# Patient Record
Sex: MALE | Race: WHITE | NOT HISPANIC OR LATINO | Employment: OTHER | URBAN - METROPOLITAN AREA
[De-identification: names, ages, dates, MRNs, and addresses within clinical notes are randomized per-mention and may not be internally consistent; named-entity substitution may affect disease eponyms.]

---

## 2018-06-25 PROBLEM — M25.512 ACUTE PAIN OF LEFT SHOULDER: Status: ACTIVE | Noted: 2018-06-25

## 2022-10-30 PROBLEM — I10 BENIGN ESSENTIAL HTN: Status: ACTIVE | Noted: 2022-10-30

## 2022-10-30 PROBLEM — T50.902A INTENTIONAL OVERDOSE (HCC): Status: ACTIVE | Noted: 2022-10-30

## 2022-10-30 PROBLEM — R45.851 SUICIDAL IDEATION: Status: ACTIVE | Noted: 2022-10-30

## 2022-10-30 PROBLEM — R74.01 TRANSAMINITIS: Status: ACTIVE | Noted: 2022-10-30

## 2022-10-30 PROBLEM — K21.9 GASTROESOPHAGEAL REFLUX DISEASE WITHOUT ESOPHAGITIS: Status: ACTIVE | Noted: 2022-10-30

## 2022-10-30 PROBLEM — F10.930 ALCOHOL WITHDRAWAL SYNDROME WITHOUT COMPLICATION (HCC): Status: ACTIVE | Noted: 2022-10-30

## 2022-10-30 PROBLEM — N40.0 BENIGN PROSTATIC HYPERPLASIA WITHOUT LOWER URINARY TRACT SYMPTOMS: Status: ACTIVE | Noted: 2022-10-30

## 2022-10-30 PROBLEM — E87.6 HYPOKALEMIA: Status: ACTIVE | Noted: 2022-10-30

## 2024-02-18 PROBLEM — J90 PLEURAL EFFUSION: Status: ACTIVE | Noted: 2024-02-18

## 2024-02-18 PROBLEM — S22.43XD MULTIPLE CLOSED FRACTURES OF RIBS OF BOTH SIDES WITH ROUTINE HEALING: Status: ACTIVE | Noted: 2024-02-18

## 2024-02-18 PROBLEM — S22.41XA MULTIPLE FRACTURES OF RIBS, RIGHT SIDE, INITIAL ENCOUNTER FOR CLOSED FRACTURE: Status: RESOLVED | Noted: 2024-02-18 | Resolved: 2024-02-18

## 2024-02-18 PROBLEM — J18.9 COMMUNITY ACQUIRED PNEUMONIA OF RIGHT LOWER LOBE OF LUNG: Status: ACTIVE | Noted: 2024-02-18

## 2024-02-18 PROBLEM — S22.41XA MULTIPLE FRACTURES OF RIBS, RIGHT SIDE, INITIAL ENCOUNTER FOR CLOSED FRACTURE: Status: ACTIVE | Noted: 2024-02-18

## 2024-03-06 PROBLEM — K55.20 GI AVM (GASTROINTESTINAL ARTERIOVENOUS VASCULAR MALFORMATION): Status: ACTIVE | Noted: 2024-03-06

## 2024-03-11 PROBLEM — J18.9 COMMUNITY ACQUIRED PNEUMONIA OF RIGHT LOWER LOBE OF LUNG: Status: RESOLVED | Noted: 2024-02-18 | Resolved: 2024-03-11

## 2024-03-11 PROBLEM — J90 PLEURAL EFFUSION: Status: RESOLVED | Noted: 2024-02-18 | Resolved: 2024-03-11

## 2024-06-25 ENCOUNTER — APPOINTMENT (OUTPATIENT)
Dept: RADIOLOGY | Facility: MEDICAL CENTER | Age: 69
End: 2024-06-25
Attending: SURGERY
Payer: MEDICARE

## 2024-06-25 ENCOUNTER — HOSPITAL ENCOUNTER (OUTPATIENT)
Dept: RADIOLOGY | Facility: MEDICAL CENTER | Age: 69
End: 2024-06-25

## 2024-06-25 ENCOUNTER — HOSPITAL ENCOUNTER (INPATIENT)
Facility: MEDICAL CENTER | Age: 69
LOS: 2 days | End: 2024-06-27
Attending: EMERGENCY MEDICINE | Admitting: SURGERY
Payer: MEDICARE

## 2024-06-25 DIAGNOSIS — S01.81XA FACIAL LACERATION, INITIAL ENCOUNTER: ICD-10-CM

## 2024-06-25 DIAGNOSIS — S06.34AA TRAUMATIC RIGHT-SIDED INTRACEREBRAL HEMORRHAGE WITH UNKNOWN LOSS OF CONSCIOUSNESS STATUS, INITIAL ENCOUNTER (HCC): ICD-10-CM

## 2024-06-25 DIAGNOSIS — F10.10 ALCOHOL ABUSE: ICD-10-CM

## 2024-06-25 DIAGNOSIS — S02.2XXA CLOSED FRACTURE OF NASAL BONE, INITIAL ENCOUNTER: ICD-10-CM

## 2024-06-25 DIAGNOSIS — K21.00 GASTROESOPHAGEAL REFLUX DISEASE WITH ESOPHAGITIS WITHOUT HEMORRHAGE: ICD-10-CM

## 2024-06-25 DIAGNOSIS — S06.6XAA TRAUMATIC SUBARACHNOID HEMORRHAGE WITH UNKNOWN LOSS OF CONSCIOUSNESS STATUS, INITIAL ENCOUNTER (HCC): ICD-10-CM

## 2024-06-25 PROBLEM — D68.9 COAGULOPATHY (HCC): Status: ACTIVE | Noted: 2024-06-25

## 2024-06-25 PROBLEM — Z87.81 HISTORY OF RIB FRACTURE: Status: ACTIVE | Noted: 2024-06-25

## 2024-06-25 PROBLEM — D50.9 IRON DEFICIENCY ANEMIA: Status: ACTIVE | Noted: 2024-06-25

## 2024-06-25 PROBLEM — F10.90 ALCOHOL USE DISORDER: Status: ACTIVE | Noted: 2024-06-25

## 2024-06-25 PROBLEM — Z53.09 CONTRAINDICATION TO DEEP VEIN THROMBOSIS (DVT) PROPHYLAXIS: Status: ACTIVE | Noted: 2024-06-25

## 2024-06-25 PROBLEM — S01.81XD FOREHEAD LACERATION, SUBSEQUENT ENCOUNTER: Status: ACTIVE | Noted: 2024-06-25

## 2024-06-25 PROBLEM — E87.20 LACTIC ACIDOSIS: Status: ACTIVE | Noted: 2024-06-25

## 2024-06-25 PROBLEM — T14.90XA TRAUMA: Status: ACTIVE | Noted: 2024-06-25

## 2024-06-25 LAB
ABO GROUP BLD: NORMAL
ALBUMIN SERPL BCP-MCNC: 3.4 G/DL (ref 3.2–4.9)
ALBUMIN/GLOB SERPL: 1.4 G/DL
ALP SERPL-CCNC: 128 U/L (ref 30–99)
ALT SERPL-CCNC: 15 U/L (ref 2–50)
ANION GAP SERPL CALC-SCNC: 19 MMOL/L (ref 7–16)
AST SERPL-CCNC: 37 U/L (ref 12–45)
BILIRUB SERPL-MCNC: 0.8 MG/DL (ref 0.1–1.5)
BLD GP AB SCN SERPL QL: NORMAL
BUN SERPL-MCNC: 16 MG/DL (ref 8–22)
CALCIUM ALBUM COR SERPL-MCNC: 8.5 MG/DL (ref 8.5–10.5)
CALCIUM SERPL-MCNC: 8 MG/DL (ref 8.5–10.5)
CFT BLD TEG: 4.6 MIN (ref 4.6–9.1)
CFT P HPASE BLD TEG: 4.4 MIN (ref 4.3–8.3)
CHLORIDE SERPL-SCNC: 98 MMOL/L (ref 96–112)
CLOT ANGLE BLD TEG: 73.5 DEGREES (ref 63–78)
CO2 SERPL-SCNC: 15 MMOL/L (ref 20–33)
CREAT SERPL-MCNC: 0.75 MG/DL (ref 0.5–1.4)
CT.EXTRINSIC BLD ROTEM: 1.3 MIN (ref 0.8–2.1)
ERYTHROCYTE [DISTWIDTH] IN BLOOD BY AUTOMATED COUNT: 59.2 FL (ref 35.9–50)
ETHANOL BLD-MCNC: 100.5 MG/DL
GFR SERPLBLD CREATININE-BSD FMLA CKD-EPI: 98 ML/MIN/1.73 M 2
GLOBULIN SER CALC-MCNC: 2.4 G/DL (ref 1.9–3.5)
GLUCOSE BLD STRIP.AUTO-MCNC: 118 MG/DL (ref 65–99)
GLUCOSE BLD STRIP.AUTO-MCNC: 128 MG/DL (ref 65–99)
GLUCOSE BLD STRIP.AUTO-MCNC: 245 MG/DL (ref 65–99)
GLUCOSE BLD STRIP.AUTO-MCNC: 92 MG/DL (ref 65–99)
GLUCOSE SERPL-MCNC: 102 MG/DL (ref 65–99)
HCT VFR BLD AUTO: 33.4 % (ref 42–52)
HGB BLD-MCNC: 10.9 G/DL (ref 14–18)
IRON SATN MFR SERPL: ABNORMAL % (ref 15–55)
IRON SERPL-MCNC: 270 UG/DL (ref 50–180)
LACTATE SERPL-SCNC: 1.2 MMOL/L (ref 0.5–2)
MCF BLD TEG: 58.8 MM (ref 52–69)
MCF.PLATELET INHIB BLD ROTEM: 17.5 MM (ref 15–32)
MCH RBC QN AUTO: 29.1 PG (ref 27–33)
MCHC RBC AUTO-ENTMCNC: 32.6 G/DL (ref 32.3–36.5)
MCV RBC AUTO: 89.3 FL (ref 81.4–97.8)
PA AA BLD-ACNC: 24 % (ref 0–11)
PA ADP BLD-ACNC: 79.4 % (ref 0–17)
PLATELET # BLD AUTO: 210 K/UL (ref 164–446)
PMV BLD AUTO: 9.3 FL (ref 9–12.9)
POTASSIUM SERPL-SCNC: 3.8 MMOL/L (ref 3.6–5.5)
PROT SERPL-MCNC: 5.8 G/DL (ref 6–8.2)
RBC # BLD AUTO: 3.74 M/UL (ref 4.7–6.1)
RH BLD: NORMAL
SODIUM SERPL-SCNC: 132 MMOL/L (ref 135–145)
TEG ALGORITHM TGALG: ABNORMAL
TIBC SERPL-MCNC: ABNORMAL UG/DL (ref 250–450)
UIBC SERPL-MCNC: <17 UG/DL (ref 110–370)
WBC # BLD AUTO: 11.8 K/UL (ref 4.8–10.8)

## 2024-06-25 PROCEDURE — 700111 HCHG RX REV CODE 636 W/ 250 OVERRIDE (IP): Mod: JZ

## 2024-06-25 PROCEDURE — 700111 HCHG RX REV CODE 636 W/ 250 OVERRIDE (IP): Performed by: SURGERY

## 2024-06-25 PROCEDURE — 86850 RBC ANTIBODY SCREEN: CPT

## 2024-06-25 PROCEDURE — 99291 CRITICAL CARE FIRST HOUR: CPT

## 2024-06-25 PROCEDURE — 700102 HCHG RX REV CODE 250 W/ 637 OVERRIDE(OP)

## 2024-06-25 PROCEDURE — 36415 COLL VENOUS BLD VENIPUNCTURE: CPT

## 2024-06-25 PROCEDURE — 700102 HCHG RX REV CODE 250 W/ 637 OVERRIDE(OP): Performed by: NURSE PRACTITIONER

## 2024-06-25 PROCEDURE — 83540 ASSAY OF IRON: CPT

## 2024-06-25 PROCEDURE — 85384 FIBRINOGEN ACTIVITY: CPT

## 2024-06-25 PROCEDURE — 700111 HCHG RX REV CODE 636 W/ 250 OVERRIDE (IP): Performed by: NURSE PRACTITIONER

## 2024-06-25 PROCEDURE — 80053 COMPREHEN METABOLIC PANEL: CPT | Mod: 91

## 2024-06-25 PROCEDURE — 99223 1ST HOSP IP/OBS HIGH 75: CPT | Performed by: SURGERY

## 2024-06-25 PROCEDURE — A9270 NON-COVERED ITEM OR SERVICE: HCPCS | Performed by: NURSE PRACTITIONER

## 2024-06-25 PROCEDURE — 85576 BLOOD PLATELET AGGREGATION: CPT | Mod: 91

## 2024-06-25 PROCEDURE — 86901 BLOOD TYPING SEROLOGIC RH(D): CPT

## 2024-06-25 PROCEDURE — 97162 PT EVAL MOD COMPLEX 30 MIN: CPT

## 2024-06-25 PROCEDURE — 83550 IRON BINDING TEST: CPT

## 2024-06-25 PROCEDURE — 85027 COMPLETE CBC AUTOMATED: CPT

## 2024-06-25 PROCEDURE — 770022 HCHG ROOM/CARE - ICU (200)

## 2024-06-25 PROCEDURE — 700102 HCHG RX REV CODE 250 W/ 637 OVERRIDE(OP): Performed by: SURGERY

## 2024-06-25 PROCEDURE — 82077 ASSAY SPEC XCP UR&BREATH IA: CPT

## 2024-06-25 PROCEDURE — 700105 HCHG RX REV CODE 258

## 2024-06-25 PROCEDURE — 83605 ASSAY OF LACTIC ACID: CPT | Mod: 91

## 2024-06-25 PROCEDURE — 97165 OT EVAL LOW COMPLEX 30 MIN: CPT

## 2024-06-25 PROCEDURE — 86900 BLOOD TYPING SEROLOGIC ABO: CPT

## 2024-06-25 PROCEDURE — 700105 HCHG RX REV CODE 258: Performed by: SURGERY

## 2024-06-25 PROCEDURE — A9270 NON-COVERED ITEM OR SERVICE: HCPCS | Performed by: SURGERY

## 2024-06-25 PROCEDURE — 700101 HCHG RX REV CODE 250: Performed by: SURGERY

## 2024-06-25 PROCEDURE — A9270 NON-COVERED ITEM OR SERVICE: HCPCS

## 2024-06-25 PROCEDURE — 82962 GLUCOSE BLOOD TEST: CPT

## 2024-06-25 PROCEDURE — 700105 HCHG RX REV CODE 258: Performed by: NURSE PRACTITIONER

## 2024-06-25 PROCEDURE — 70450 CT HEAD/BRAIN W/O DYE: CPT

## 2024-06-25 PROCEDURE — 92523 SPEECH SOUND LANG COMPREHEN: CPT

## 2024-06-25 PROCEDURE — 305948 HCHG GREEN TRAUMA ACT PRE-NOTIFY NO CC

## 2024-06-25 PROCEDURE — 85347 COAGULATION TIME ACTIVATED: CPT

## 2024-06-25 RX ORDER — ONDANSETRON 4 MG/1
4 TABLET, ORALLY DISINTEGRATING ORAL EVERY 4 HOURS PRN
Status: DISCONTINUED | OUTPATIENT
Start: 2024-06-25 | End: 2024-06-27 | Stop reason: HOSPADM

## 2024-06-25 RX ORDER — LORAZEPAM 2 MG/ML
4 INJECTION INTRAMUSCULAR
Status: DISCONTINUED | OUTPATIENT
Start: 2024-06-25 | End: 2024-06-26

## 2024-06-25 RX ORDER — AMLODIPINE BESYLATE 5 MG/1
10 TABLET ORAL DAILY
Status: DISCONTINUED | OUTPATIENT
Start: 2024-06-25 | End: 2024-06-27 | Stop reason: HOSPADM

## 2024-06-25 RX ORDER — OXYCODONE HYDROCHLORIDE 5 MG/1
2.5 TABLET ORAL
Status: DISCONTINUED | OUTPATIENT
Start: 2024-06-25 | End: 2024-06-27 | Stop reason: HOSPADM

## 2024-06-25 RX ORDER — BISACODYL 10 MG
10 SUPPOSITORY, RECTAL RECTAL
Status: DISCONTINUED | OUTPATIENT
Start: 2024-06-25 | End: 2024-06-27 | Stop reason: HOSPADM

## 2024-06-25 RX ORDER — LORAZEPAM 2 MG/ML
1 INJECTION INTRAMUSCULAR
Status: DISCONTINUED | OUTPATIENT
Start: 2024-06-25 | End: 2024-06-26

## 2024-06-25 RX ORDER — OMEPRAZOLE 20 MG/1
20 CAPSULE, DELAYED RELEASE ORAL DAILY
Status: DISCONTINUED | OUTPATIENT
Start: 2024-06-25 | End: 2024-06-27 | Stop reason: HOSPADM

## 2024-06-25 RX ORDER — LORAZEPAM 2 MG/ML
2 INJECTION INTRAMUSCULAR
Status: DISCONTINUED | OUTPATIENT
Start: 2024-06-25 | End: 2024-06-26

## 2024-06-25 RX ORDER — IBUPROFEN 200 MG
400 TABLET ORAL EVERY 8 HOURS PRN
Status: ON HOLD | COMMUNITY
End: 2024-06-27

## 2024-06-25 RX ORDER — OXYCODONE HYDROCHLORIDE 5 MG/1
5 TABLET ORAL
Status: DISCONTINUED | OUTPATIENT
Start: 2024-06-25 | End: 2024-06-27 | Stop reason: HOSPADM

## 2024-06-25 RX ORDER — GAUZE BANDAGE 2" X 2"
100 BANDAGE TOPICAL DAILY
Status: DISCONTINUED | OUTPATIENT
Start: 2024-06-26 | End: 2024-06-27 | Stop reason: HOSPADM

## 2024-06-25 RX ORDER — ACETAMINOPHEN 500 MG
1000 TABLET ORAL EVERY 6 HOURS PRN
COMMUNITY

## 2024-06-25 RX ORDER — ENEMA 19; 7 G/133ML; G/133ML
1 ENEMA RECTAL
Status: DISCONTINUED | OUTPATIENT
Start: 2024-06-25 | End: 2024-06-27 | Stop reason: HOSPADM

## 2024-06-25 RX ORDER — FOLIC ACID 1 MG/1
1 TABLET ORAL DAILY
Status: DISCONTINUED | OUTPATIENT
Start: 2024-06-26 | End: 2024-06-27 | Stop reason: HOSPADM

## 2024-06-25 RX ORDER — POLYETHYLENE GLYCOL 3350 17 G/17G
1 POWDER, FOR SOLUTION ORAL 2 TIMES DAILY
Status: DISCONTINUED | OUTPATIENT
Start: 2024-06-25 | End: 2024-06-27 | Stop reason: HOSPADM

## 2024-06-25 RX ORDER — SODIUM CHLORIDE 9 MG/ML
INJECTION, SOLUTION INTRAVENOUS CONTINUOUS
Status: DISCONTINUED | OUTPATIENT
Start: 2024-06-25 | End: 2024-06-26

## 2024-06-25 RX ORDER — ONDANSETRON 2 MG/ML
4 INJECTION INTRAMUSCULAR; INTRAVENOUS EVERY 4 HOURS PRN
Status: DISCONTINUED | OUTPATIENT
Start: 2024-06-25 | End: 2024-06-27 | Stop reason: HOSPADM

## 2024-06-25 RX ORDER — FERROUS SULFATE 325(65) MG
325 TABLET ORAL
Status: DISCONTINUED | OUTPATIENT
Start: 2024-06-26 | End: 2024-06-27 | Stop reason: HOSPADM

## 2024-06-25 RX ORDER — AMOXICILLIN 250 MG
1 CAPSULE ORAL NIGHTLY
Status: DISCONTINUED | OUTPATIENT
Start: 2024-06-25 | End: 2024-06-27 | Stop reason: HOSPADM

## 2024-06-25 RX ORDER — BACITRACIN ZINC 500 [USP'U]/G
OINTMENT TOPICAL 2 TIMES DAILY
Status: DISCONTINUED | OUTPATIENT
Start: 2024-06-25 | End: 2024-06-27 | Stop reason: HOSPADM

## 2024-06-25 RX ORDER — LORAZEPAM 2 MG/ML
3 INJECTION INTRAMUSCULAR
Status: DISCONTINUED | OUTPATIENT
Start: 2024-06-25 | End: 2024-06-26

## 2024-06-25 RX ORDER — AMOXICILLIN 250 MG
1 CAPSULE ORAL
Status: DISCONTINUED | OUTPATIENT
Start: 2024-06-25 | End: 2024-06-27 | Stop reason: HOSPADM

## 2024-06-25 RX ORDER — LEVETIRACETAM 500 MG/5ML
500 INJECTION, SOLUTION, CONCENTRATE INTRAVENOUS EVERY 12 HOURS
Status: DISCONTINUED | OUTPATIENT
Start: 2024-06-25 | End: 2024-06-27 | Stop reason: HOSPADM

## 2024-06-25 RX ORDER — HYDROMORPHONE HYDROCHLORIDE 1 MG/ML
0.25 INJECTION, SOLUTION INTRAMUSCULAR; INTRAVENOUS; SUBCUTANEOUS
Status: DISCONTINUED | OUTPATIENT
Start: 2024-06-25 | End: 2024-06-27 | Stop reason: HOSPADM

## 2024-06-25 RX ORDER — DOCUSATE SODIUM 100 MG/1
100 CAPSULE, LIQUID FILLED ORAL 2 TIMES DAILY
Status: DISCONTINUED | OUTPATIENT
Start: 2024-06-25 | End: 2024-06-27 | Stop reason: HOSPADM

## 2024-06-25 RX ORDER — ACETAMINOPHEN 325 MG/1
650 TABLET ORAL EVERY 4 HOURS PRN
Status: DISCONTINUED | OUTPATIENT
Start: 2024-06-25 | End: 2024-06-27 | Stop reason: HOSPADM

## 2024-06-25 RX ORDER — LEVETIRACETAM 500 MG/1
500 TABLET ORAL EVERY 12 HOURS
Status: DISCONTINUED | OUTPATIENT
Start: 2024-06-25 | End: 2024-06-27 | Stop reason: HOSPADM

## 2024-06-25 RX ADMIN — OXYCODONE HYDROCHLORIDE 5 MG: 5 TABLET ORAL at 08:10

## 2024-06-25 RX ADMIN — BACITRACIN ZINC: 500 OINTMENT TOPICAL at 09:29

## 2024-06-25 RX ADMIN — AMLODIPINE BESYLATE 10 MG: 10 TABLET ORAL at 12:48

## 2024-06-25 RX ADMIN — OXYCODONE HYDROCHLORIDE 5 MG: 5 TABLET ORAL at 12:51

## 2024-06-25 RX ADMIN — FOLIC ACID: 5 INJECTION, SOLUTION INTRAMUSCULAR; INTRAVENOUS; SUBCUTANEOUS at 10:39

## 2024-06-25 RX ADMIN — BACITRACIN ZINC: 500 OINTMENT TOPICAL at 17:16

## 2024-06-25 RX ADMIN — INSULIN HUMAN 2 UNITS: 100 INJECTION, SOLUTION PARENTERAL at 12:57

## 2024-06-25 RX ADMIN — OXYCODONE HYDROCHLORIDE 5 MG: 5 TABLET ORAL at 16:51

## 2024-06-25 RX ADMIN — LEVETIRACETAM 500 MG: 500 TABLET, FILM COATED ORAL at 06:46

## 2024-06-25 RX ADMIN — SODIUM CHLORIDE: 9 INJECTION, SOLUTION INTRAVENOUS at 16:53

## 2024-06-25 RX ADMIN — HYDROMORPHONE HYDROCHLORIDE 0.25 MG: 1 INJECTION, SOLUTION INTRAMUSCULAR; INTRAVENOUS; SUBCUTANEOUS at 09:29

## 2024-06-25 RX ADMIN — SODIUM CHLORIDE 900 ML: 9 INJECTION, SOLUTION INTRAVENOUS at 06:50

## 2024-06-25 RX ADMIN — SODIUM CHLORIDE 250 MG: 9 INJECTION, SOLUTION INTRAVENOUS at 17:21

## 2024-06-25 RX ADMIN — HYDROMORPHONE HYDROCHLORIDE 0.25 MG: 1 INJECTION, SOLUTION INTRAMUSCULAR; INTRAVENOUS; SUBCUTANEOUS at 22:19

## 2024-06-25 RX ADMIN — OXYCODONE HYDROCHLORIDE 5 MG: 5 TABLET ORAL at 23:55

## 2024-06-25 RX ADMIN — LEVETIRACETAM 500 MG: 500 TABLET, FILM COATED ORAL at 17:16

## 2024-06-25 RX ADMIN — OXYCODONE HYDROCHLORIDE 5 MG: 5 TABLET ORAL at 20:08

## 2024-06-25 RX ADMIN — OMEPRAZOLE 20 MG: 20 CAPSULE, DELAYED RELEASE ORAL at 12:48

## 2024-06-25 ASSESSMENT — ENCOUNTER SYMPTOMS
EYE PAIN: 0
VOMITING: 0
FEVER: 0
WEAKNESS: 0
MYALGIAS: 1
SPEECH CHANGE: 0
DIAPHORESIS: 0
SENSORY CHANGE: 0
DIZZINESS: 0
ABDOMINAL PAIN: 0
TINGLING: 0
BACK PAIN: 0
NAUSEA: 0
FOCAL WEAKNESS: 0
DOUBLE VISION: 0
COUGH: 0
SHORTNESS OF BREATH: 0
HEADACHES: 1
TREMORS: 0
NECK PAIN: 0
BLURRED VISION: 0
PHOTOPHOBIA: 0
TREMORS: 1
PALPITATIONS: 0
CHILLS: 0

## 2024-06-25 ASSESSMENT — PAIN DESCRIPTION - PAIN TYPE
TYPE: ACUTE PAIN;OTHER (COMMENT)
TYPE: ACUTE PAIN
TYPE: ACUTE PAIN
TYPE: ACUTE PAIN;OTHER (COMMENT)
TYPE: ACUTE PAIN
TYPE: ACUTE PAIN;OTHER (COMMENT)
TYPE: ACUTE PAIN
TYPE: ACUTE PAIN;OTHER (COMMENT)
TYPE: ACUTE PAIN
TYPE: ACUTE PAIN

## 2024-06-25 ASSESSMENT — GAIT ASSESSMENTS
DISTANCE (FEET): 200
DEVIATION: SHUFFLED GAIT;BRADYKINETIC;INCREASED BASE OF SUPPORT
GAIT LEVEL OF ASSIST: SUPERVISED

## 2024-06-25 ASSESSMENT — LIFESTYLE VARIABLES
ON A TYPICAL DAY WHEN YOU DRINK ALCOHOL HOW MANY DRINKS DO YOU HAVE: 8
EVER HAD A DRINK FIRST THING IN THE MORNING TO STEADY YOUR NERVES TO GET RID OF A HANGOVER: NO
EVER FELT BAD OR GUILTY ABOUT YOUR DRINKING: NO
CONSUMPTION TOTAL: INCOMPLETE
HAVE YOU EVER FELT YOU SHOULD CUT DOWN ON YOUR DRINKING: YES
DOES PATIENT WANT TO TALK TO SOMEONE ABOUT QUITTING: YES
AVERAGE NUMBER OF DAYS PER WEEK YOU HAVE A DRINK CONTAINING ALCOHOL: 7
TOTAL SCORE: 2
DOES PATIENT WANT TO STOP DRINKING: YES
HAVE PEOPLE ANNOYED YOU BY CRITICIZING YOUR DRINKING: YES
TOTAL SCORE: 2
ALCOHOL_USE: YES
TOTAL SCORE: 2

## 2024-06-25 ASSESSMENT — PATIENT HEALTH QUESTIONNAIRE - PHQ9
9. THOUGHTS THAT YOU WOULD BE BETTER OFF DEAD, OR OF HURTING YOURSELF: NOT AT ALL
8. MOVING OR SPEAKING SO SLOWLY THAT OTHER PEOPLE COULD HAVE NOTICED. OR THE OPPOSITE, BEING SO FIGETY OR RESTLESS THAT YOU HAVE BEEN MOVING AROUND A LOT MORE THAN USUAL: NOT AT ALL
3. TROUBLE FALLING OR STAYING ASLEEP OR SLEEPING TOO MUCH: MORE THAN HALF THE DAYS
1. LITTLE INTEREST OR PLEASURE IN DOING THINGS: MORE THAN HALF THE DAYS
2. FEELING DOWN, DEPRESSED, IRRITABLE, OR HOPELESS: NOT AT ALL
SUM OF ALL RESPONSES TO PHQ QUESTIONS 1-9: 4
7. TROUBLE CONCENTRATING ON THINGS, SUCH AS READING THE NEWSPAPER OR WATCHING TELEVISION: NOT AT ALL
5. POOR APPETITE OR OVEREATING: NOT AT ALL
6. FEELING BAD ABOUT YOURSELF - OR THAT YOU ARE A FAILURE OR HAVE LET YOURSELF OR YOUR FAMILY DOWN: NOT AL ALL
4. FEELING TIRED OR HAVING LITTLE ENERGY: NOT AT ALL
SUM OF ALL RESPONSES TO PHQ9 QUESTIONS 1 AND 2: 2

## 2024-06-25 ASSESSMENT — COPD QUESTIONNAIRES
DO YOU EVER COUGH UP ANY MUCUS OR PHLEGM?: NO/ONLY WITH OCCASIONAL COLDS OR INFECTIONS
DURING THE PAST 4 WEEKS HOW MUCH DID YOU FEEL SHORT OF BREATH: MOST  OR ALL OF THE TIME
COPD SCREENING SCORE: 7
HAVE YOU SMOKED AT LEAST 100 CIGARETTES IN YOUR ENTIRE LIFE: YES

## 2024-06-25 ASSESSMENT — COGNITIVE AND FUNCTIONAL STATUS - GENERAL
DAILY ACTIVITIY SCORE: 24
CLIMB 3 TO 5 STEPS WITH RAILING: A LITTLE
MOBILITY SCORE: 22
SUGGESTED CMS G CODE MODIFIER MOBILITY: CJ
WALKING IN HOSPITAL ROOM: A LITTLE
SUGGESTED CMS G CODE MODIFIER DAILY ACTIVITY: CH

## 2024-06-25 ASSESSMENT — FIBROSIS 4 INDEX
FIB4 SCORE: 3.68
FIB4 SCORE: 3.09
FIB4 SCORE: 3.09

## 2024-06-25 ASSESSMENT — ACTIVITIES OF DAILY LIVING (ADL): TOILETING: INDEPENDENT

## 2024-06-25 NOTE — ED TRIAGE NOTES
"Chief Complaint   Patient presents with    Trauma Green     Trauma green transfer from [place]. ICH W/ SAH. Fell 2 days ago, and once again tonight in care home. +ETOH, CO2 10, LA 5.3,            /71   Pulse 92   Temp 37.3 °C (99.1 °F)   Resp 18   Ht 1.778 m (5' 10\")   Wt 74.8 kg (165 lb)   SpO2 94%   BMI 23.68 kg/m²     "

## 2024-06-25 NOTE — PROGRESS NOTES
"  And Progress Note     This note is intended for the purposes of medical student education and feedback only.   Please refer to the documentation by this patient's assigned medical practitioner for details of care and plans.      CC: 70 yo male brought in as a trauma green transfer from [place] with right temporal hemorrhagic brain contusion and metabolic acidosis after fall in setting of acute alcohol intoxication. Of note, patient with history of homelessness, chronic alcohol use, and recurrent falls in setting of alcohol use.     Interval Updates: No acute events    Assessment/Plan:  -Patient with stable 10 mm parenchymal hemorrhage in right temporal lobe, meeting criteria for BIG 3 head injury. Patient to remain in TICU for 24 hrs of serial neuro checks, anticipate transfer to floor tomorrow.   -GCS 15.  -Chronic alcohol use; CIWA protocol indicated today.   -Metabolic acidosis with down trending lactic acid, trend.   -Tertiary survey negative.   -Hold DVT ppx with repeat venous duplex u/s on 6/27.    Mental status adequate for full examination?: Yes    Spine cleared (radiologically and/or clinically): Yes    REVIEW OF SYSTEMS:  Review of Systems   Constitutional:  Negative for chills and fever.   HENT:  Negative for ear pain and hearing loss.    Eyes:  Negative for blurred vision, double vision, photophobia and pain.   Respiratory:  Negative for cough and shortness of breath.    Cardiovascular:  Negative for palpitations.        Complaining of Rib pain from prior fractures in 02/2024   Gastrointestinal:  Negative for abdominal pain, nausea and vomiting.   Musculoskeletal:         Chronic bilateral shoulder pain     Neurological:  Positive for tremors and headaches. Negative for dizziness, tingling, focal weakness and weakness.         PHYSICAL EXAMINATION:  /70   Pulse 80   Temp 36.3 °C (97.3 °F) (Temporal)   Resp (!) 24   Ht 1.778 m (5' 10\")   Wt 66.1 kg (145 lb 11.6 oz)   SpO2 91%   BMI 20.91 " kg/m²   Physical Exam  Constitutional:       General: He is not in acute distress.  HENT:      Head: Normocephalic.      Comments: Healing superficial head lacerations      Right Ear: External ear normal.      Left Ear: External ear normal.      Nose: Nose normal.      Mouth/Throat:      Mouth: Mucous membranes are moist.      Pharynx: Oropharynx is clear.   Eyes:      Extraocular Movements: Extraocular movements intact.      Conjunctiva/sclera: Conjunctivae normal.      Pupils: Pupils are equal, round, and reactive to light.   Cardiovascular:      Rate and Rhythm: Normal rate and regular rhythm.      Heart sounds: No murmur heard.  Pulmonary:      Effort: Pulmonary effort is normal. No respiratory distress.      Breath sounds: Normal breath sounds. No wheezing or rhonchi.   Chest:      Chest wall: No tenderness.   Abdominal:      General: Bowel sounds are normal. There is no distension.      Palpations: Abdomen is soft.   Musculoskeletal:         General: No swelling or deformity. Normal range of motion.      Right shoulder: Tenderness present.      Left shoulder: Tenderness present.      Cervical back: Normal range of motion. No rigidity.      Comments: Good and equal strength in the upper and lower extremities bilaterally    Skin:     General: Skin is warm and dry.      Capillary Refill: Capillary refill takes less than 2 seconds.   Neurological:      General: No focal deficit present.      Mental Status: He is alert.      GCS: GCS eye subscore is 4. GCS verbal subscore is 5. GCS motor subscore is 6.   Psychiatric:         Behavior: Behavior is cooperative.         LABORATORY VALUES:  Recent Labs     06/25/24  0050 06/25/24  0548   WBC 13.6* 11.8*   RBC 4.34* 3.74*   HEMOGLOBIN 12.7* 10.9*   HEMATOCRIT 38.9* 33.4*   MCV 89.6 89.3   MCH 29.3 29.1   MCHC 32.6* 32.6   RDW 18.1* 59.2*   PLATELETCT 224 210   MPV 9.4 9.3     Recent Labs     06/25/24  0050 06/25/24  0548   SODIUM 133* 132*   POTASSIUM 4.4 3.8   CHLORIDE  99 98   CO2 10* 15*   GLUCOSE 97 102*   BUN 21* 16   CREATININE 1.3 0.75   CALCIUM 8.8 8.0*     Recent Labs     06/25/24  0050 06/25/24  0548   ASTSGOT 63* 37   ALTSGPT 27 15   TBILIRUBIN 1.1 0.8   ALKPHOSPHAT 145* 128*   GLOBULIN  --  2.4   INR 0.95  --      Recent Labs     06/25/24  0050   APTT 25.1   INR 0.95       IMAGING:  CT-HEAD W/O   Final Result      1.  Mild cerebral atrophy as well as atrophy of the superior cerebellar vermis.   2.  Encephalomalacic changes in the right temporal lobe consistent with old infarct, chronic sequela of posttraumatic brain contusion, or other remote insult.   3.  Stable appearing 10 mm parenchymal hemorrhage in the right temporal lobe most consistent with hemorrhagic brain contusion. No change from outside films from earlier on today's date      Based solely on CT findings, the brain injury guideline category is mBIG 3.      EDH   IVH   Displaced skull fx   SDH > 8mm   IPH > 8mm or multiple   SAH bi-hemispheric or > 3mm      The original BIG retrospective analysis found radiographic progression in 0% of BIG 1 patients and 2.6% BIG 2.            OUTSIDE IMAGES-DX CHEST   Final Result      OUTSIDE IMAGES-CT HEAD   Final Result      OUTSIDE IMAGES-CT CHEST   Final Result      OUTSIDE IMAGES-CT FACE   Final Result      OUTSIDE IMAGES-CT CERVICAL SPINE   Final Result          RAP Score Total: 6      CAGE Results: not completed Blood Alcohol>0.08: yes CAGE Score: 2  Total: Incomplete  Assessment complete date: 6/25/2024  Intervention: Complete. Patient response to intervention:.   Patient demonstrates understanding of intervention. Patient agrees to follow-up.   has been contacted. Follow up with: PCP  Total ETOH intervention time: 15 - 30 mintues      PDI Score: Not completed   (Score > 23 = Psychiatry consult)    All current laboratory studies/radiology exams reviewed: Yes    Medications reconciliation has been reviewed: Yes    Completed Consultations:    Sekou Celaya, neurosurgery and SLP,     Pending Consultations:  PT, OT     Newly identified diagnoses, injuries and/or co-morbidities:  None    Discussed patient condition with patient, nurse, Trauma PA, Dr. Reynolds      This note is intended for the purposes of medical student education and feedback only.   Please refer to the documentation by this patient's assigned medical practitioner for details of care and plans.    Assessment and Plan     Trauma  Reportedly fell multiple times within last 5 days. Fell again in skilled nursing last night and was taken to ED for evaluation.  Trauma Green Transfer Activation from [facility] in [place], [place].  America Eric MD. Trauma Surgery.    Traumatic subarachnoid hemorrhage with unknown loss of consciousness status (HCC)  1.2 cm subarachnoid hemorrhage right temporal lobe.  Repeat head CT stable.  Non-operative management.  Post traumatic pharmacologic seizure prophylaxis for 1 week.  Speech Language Pathology cognitive evaluation.  Sekou Celaya MD. Neurosurgeon. [facility].    Lactic acidosis  Initial lactic level at [facility] 5.3.  Repeat level 4.0  Trend labs.    Alcohol use disorder  Blood alcohol level of 260 at [facility].  Per chart review history of withdrawal syndrome  Trauma alcohol withdrawal protocol initiated.  Alcohol withdrawal surveillance.    Contraindication to deep vein thrombosis (DVT) prophylaxis  VTE prophylaxis initially contraindicated secondary to elevated bleeding risk.  6/27 Trauma surveillance venous duplex ultrasonography ordered.    Nasal bone fractures  Bilateral nasal bone fractures, possibly chronic.  Non operative management.  Analgesia.    Benign essential HTN  Chronic condition treated with amlodipine.  Resumed maintenance medication on admission.    GERD with esophagitis  Recent EGD in March with esophagitis.   Chronic condition treated with protonix.  Resumed maintenance medication  on admission.    Forehead laceration, subsequent encounter  2 forehead lacerations.   Bacitracin.    History of rib fracture  Bilateral chronic rib fractures.   Aggressive pulmonary hygiene.    Coagulopathy (HCC)  Admit TEG with 79.4% AA inhibition.   Repeat head CT stable, no platelet transfusion indicated per neurosurgery.    Iron deficiency anemia  Admit Hgb 10.9  Chronic condition treated with iron sulfate.  Resumed maintenance medication on admission.  Check iron studies.   Monitor Hgb & transfuse PRBCs for Hgb < 7.0    Debby Luo MS4  [facility]

## 2024-06-25 NOTE — THERAPY
"Speech Language Pathology   Cognitive Evaluation     Patient Name: Sam Potts  AGE:  69 y.o., SEX:  male  Medical Record #: [id number]  Date of Service: 6/25/2024      History of Present Illness  70 y/o male admitted 6/25 following a fall with head strike a day prior to admission. No reported LOC. Reportedly had chest pain causing him to seek admission.    CMHx: Nasal bone fx, forehead laceration, SAH  PMHx: GERD, HTN, chronic pain, prior tractor injury, withdrawal    No hx SLP in [facility].    CT Head 6/25:  \"1.  Mild cerebral atrophy as well as atrophy of the superior cerebellar vermis.  2.  Encephalomalacic changes in the right temporal lobe consistent with old infarct, chronic sequela of posttraumatic brain contusion, or other remote insult.  3.  Stable appearing 10 mm parenchymal hemorrhage in the right temporal lobe most consistent with hemorrhagic brain contusion. No change from outside films from earlier on today's date     Based solely on CT findings, the brain injury guideline category is mBIG 3.\"    General Information  Vitals  O2 Delivery Device: None - Room Air  Level of Consciousness: Alert, Awake  Patient Behaviors: Flat Affect  Orientation: Oriented x 4  Follows Directives: Yes      Prior Living Situation & Level of Function  Housing / Facility: Homeless  Communication: Reports I with IADLs at baseline       Oral Mechanism Evaluation  Facial Symmetry: Equal  Labial Observations: WFL  Lingual Observations: Midline  Motor Speech: WFL - 100% intelligible at the conversational level, so overt concern for apraxia or dysarthria  Voice Quality: WFL      Subjective  Pt agreeable and cooperative with SLP evaluation tasks. Reports that his performance on therapy tasks was consistent with his baseline.      Communication Domain(s)  Expressive Language: WFL  Receptive Language: WFL  Cognitive-Linguistic: Mild  Motor Speech: WFL       Assessment  The patient was seen this date for a cognitive " "evaluation.      Cognistat  Orientation: Average  Attention: Average  Repetition: Average  Naming: Average  Memory: Mild  Calculations: Average  Similarities: Mild  Judgement: Mild    Other Non-Standard Measures  1-step commands: 100%  2-step commands: 100%  3-step commands: 75%  Picture Description Task: Pt demonstrated appropriate language fluency and content. No inattention. Pt was able to identify problems in the picture and proposed a reasonable solution to one of two problems.     Medication Management  Fair synthesis of information during medication management tasks. Answered temporal and numerical reasoning tasks with 75% accuracy. Adequate problem solving demonstrated when asked for a memory enhancing strategy. Pt does not currently report taking routine medications although he does report that he has taken prescription medications in the past. Good judgement and divergent thinking when asked to list questions they would want to ask about a newly prescribed medication.     Clock Drawing  Fair organization with clock drawing task. Clock drawn scored 5/13 indicating severe impairment per CLQT protocol. Errors as follows: no clock hands drawn; patient wrote \":50\" next to the 10.      Clinical Impressions  Pt presents with overall mild cognitive deficits in memory, judgement, numerical reasoning, and executive functioning. Pt reports these deficits are consistent with his baseline, and therefore patient is likely not a candidate for ongoing speech therapy. He does report generally reasonable IADL routines and may be able to return to previous level of independence; however, would also consider group home or other community support as appropriate.       NOTE: It is not within the scope of practice of Speech-Language Pathologists to determine patient capacity. Please defer to the physician or psych to complete this assessment.       Recommendations  Supervision Needs Upons Discharge: Intermittent assistance with " IADLs (see below)  IADLs: Financial management, Appointment management, Medication management         SLP Treatment Plan  Treatment Plan: None Indicated  SLP Frequency: N/A - Evaluation Only  Estimated Duration: N/A - Evaluation Only      Anticipated Discharge Needs  Discharge Recommendations: Anticipate that the patient will have no further speech therapy needs after discharge from the hospital (Patient may benefit from intermittent A with IADLs; consider group home or other community support)  Therapy Recommendations Upon DC: Not Indicated      Agnes Redding, SLP

## 2024-06-25 NOTE — H&P
TRAUMA HISTORY AND PHYSICAL    DATE OF SERVICE: 6/25/2024    ACTIVATION LEVEL: green transfer.     HISTORY OF PRESENT ILLNESS: The patient is a 69 year-old man who was injured when he tripped on a step and fell, hitting his head over a day ago. He did not have LOC. He says he initially did not seek medical care because he did not feel he needed it, but his chest started hurting and he decided to seek care. His vital signs are stable, he is GCS 15.     The patient was triaged to [facility] in accordance with established pre hospital protocols. An expeditious primary and secondary survey with required adjuncts was conducted. See Trauma Narrator for full details. I was called at 0600 and evaluated patient at 0645.    PAST MEDICAL HISTORY:   Past Medical History:   Diagnosis Date    Acid reflux     Benign essential HTN 10/30/2022    Blood transfusion without reported diagnosis     Chronic pain     Head ache     Prostate enlargement       Prior tractor injury  History of etoh withdrawal    PAST SURGICAL HISTORY:   Past Surgical History:   Procedure Laterality Date    NH COLONOSCOPY,DIAGNOSTIC  3/6/2024    Procedure: DIAGNOSTIC AND THERAPEUTIC COLONOSCOPY, with BLEEDING THERAPY;  Surgeon: Nelsy Morris D.O.;  [facility];  Service: Gastroenterology    ENDOSCOPY PROCEDURE  3/5/2024    Procedure: ESOPHAGOGASTRODUODENOSCOPY, BIOPSY;  Surgeon: Nelsy Morris D.O.;  Location: [facility];  Service: Gastroenterology    COLON RESECTION      OTHER ORTHOPEDIC SURGERY      crushed by a tractor, 5 major surgeries 2009    SHOULDER SURGERY      [facility]      Multiple orthopedic injuries     ALLERGIES: Apple cider vinegar       CURRENT MEDICATIONS:   Home Medications       Reviewed by Tim Malik, R.N. (Registered Nurse) on 06/25/24 at 0548  Med List Status: Partial     Medication Last Dose Status   acetaminophen (TYLENOL) 325 MG Tab  Active   amLODIPine (NORVASC) 10 MG Tab   "Active   calcium carbonate (TUMS) 500 MG Chew Tab  Active   ferrous sulfate 325 (65 Fe) MG tablet  Active   pantoprazole (PROTONIX) 40 MG Tablet Delayed Response  Active                  Audit from Redirected Encounters    **Home medications have not yet been reviewed for this encounter**      Patient tells me he does not take daily medication    Unable to obtain due to patient condition  Please refer to the medication reconciliation in [facility]    FAMILY HISTORY: family history includes Alcohol/Drug in his brother; Asthma in his brother; Heart Attack in his father; Other in his brother.      SOCIAL HISTORY:  reports that he has been smoking cigarettes. He started smoking about 1 years ago. He has a 1 pack-year smoking history. He has never used smokeless tobacco. He reports current alcohol use. He reports that he does not use drugs.   Smokes about a pack a week and vodka almost daily. He is a retired .    REVIEW OF SYSTEMS:   Comprehensive review of systems is negative with the exception of the aforementioned HPI, PMH, and PSH bullets in accordance with CMS guidelines.    PHYSICAL EXAMINATION:   Vital Signs: /69   Pulse 83   Temp 36.4 °C (97.6 °F) (Temporal)   Resp 18   Ht 1.778 m (5' 10\")   Wt 62.1 kg (136 lb 14.5 oz)   SpO2 93%   Physical Exam  Vitals and nursing note reviewed. Exam conducted with a chaperone present.   HENT:      Head: Normocephalic.      Comments: Forehead laceration x2 over eyebrows     Right Ear: External ear normal.      Left Ear: External ear normal.      Mouth/Throat:      Mouth: Mucous membranes are dry.      Pharynx: Oropharynx is clear.   Eyes:      Extraocular Movements: Extraocular movements intact.      Conjunctiva/sclera: Conjunctivae normal.      Pupils: Pupils are equal, round, and reactive to light.   Cardiovascular:      Rate and Rhythm: Normal rate and regular rhythm.      Pulses: Normal pulses.   Pulmonary:      Effort: Pulmonary effort is normal.      Breath " sounds: Normal breath sounds.   Abdominal:      General: Abdomen is flat.      Palpations: Abdomen is soft.   Musculoskeletal:         General: Normal range of motion.      Cervical back: Neck supple. No tenderness.      Comments: Shoulder abrasion   Skin:     General: Skin is warm.      Capillary Refill: Capillary refill takes less than 2 seconds.   Neurological:      General: No focal deficit present.      Mental Status: He is alert and oriented to person, place, and time.   Psychiatric:         Mood and Affect: Mood normal.         LABORATORY VALUES:   Recent Labs     06/25/24  0050 06/25/24  0548   WBC 13.6* 11.8*   RBC 4.34* 3.74*   HEMOGLOBIN 12.7* 10.9*   HEMATOCRIT 38.9* 33.4*   MCV 89.6 89.3   MCH 29.3 29.1   MCHC 32.6* 32.6   RDW 18.1* 59.2*   PLATELETCT 224 210   MPV 9.4 9.3     Recent Labs     06/25/24  0050 06/25/24  0548   SODIUM 133* 132*   POTASSIUM 4.4 3.8   CHLORIDE 99 98   CO2 10* 15*   GLUCOSE 97 102*   BUN 21* 16   CREATININE 1.3 0.75   CALCIUM 8.8 8.0*     Recent Labs     06/25/24  0050 06/25/24  0548   ASTSGOT 63* 37   ALTSGPT 27 15   TBILIRUBIN 1.1 0.8   ALKPHOSPHAT 145* 128*   GLOBULIN  --  2.4   INR 0.95  --      Recent Labs     06/25/24  0050   APTT 25.1   INR 0.95        IMAGING:   OUTSIDE IMAGES-DX CHEST   Final Result      OUTSIDE IMAGES-CT HEAD   Final Result      OUTSIDE IMAGES-CT CHEST   Final Result      OUTSIDE IMAGES-CT FACE   Final Result      OUTSIDE IMAGES-CT CERVICAL SPINE   Final Result      CT-HEAD W/O    (Results Pending)       IMPRESSION AND PLAN:  68yo with multiple falls and alcohol addiction. Admit to ICU with traumatic brain injury. Pulmonary toilet.  Will need seizure prophylaxis and a speech evaluation.  Monitor closely for etoh withdrawal. Trend lactate. Prior lacerations are fused and well approximated, bacitracin.     * Trauma- (present on admission)  Assessment & Plan  Reportedly fell multiple times within last 5 days. Fell again in intermediate last night and was taken  to ED for evaluation.  Trauma Green Transfer Activation from [facility] in [place], [place].  America Eric MD. Trauma Surgery.    Lactic acidosis- (present on admission)  Assessment & Plan  Initial lactic level at [facility] 5.3.  Repeat level 4.0  Trend labs.    Traumatic subarachnoid hemorrhage with unknown loss of consciousness status (HCC)- (present on admission)  Assessment & Plan  1.2 cm subarachnoid hemorrhage right temporal lobe.  ETOH.  Non-operative management.  Post traumatic pharmacologic seizure prophylaxis for 1 week.  Speech Language Pathology cognitive evaluation.  Sekou Celaya MD. Neurosurgeon. [facility].    Nasal bone fractures- (present on admission)  Assessment & Plan  Non operative management.  Analgesia.    Contraindication to deep vein thrombosis (DVT) prophylaxis- (present on admission)  Assessment & Plan  VTE prophylaxis initially contraindicated secondary to elevated bleeding risk.  6/27 Trauma surveillance venous duplex ultrasonography ordered.    Alcohol abuse- (present on admission)  Assessment & Plan  Blood alcohol level of 260 at [facility].  Per chart review history of withdrawal syndrome  Trauma alcohol withdrawal protocol initiated.  Alcohol withdrawal surveillance.    History of rib fracture- (present on admission)  Assessment & Plan  Bilateral Chronic Rib fractures.   Pulmonary toilet and oxygen prn.     Gastroesophageal reflux disease without esophagitis- (present on admission)  Assessment & Plan  Chronic condition treated with protonix.  Medication reconciliation pending.    Benign essential HTN- (present on admission)  Assessment & Plan  Chronic condition treated with amlodipine.  Medication reconciliation pending.    Forehead laceration, subsequent encounter- (present on admission)  Assessment & Plan  2 forehead lacerations. No active bleeding.  Per patient, sustained 2 days ago.  Local care.        Aggregated care time  spent evaluating, reviewing documentation, providing care, and managing this patient exclusive of procedures: 55 minutes  ____________________________________   EVAN Fontanez / JOSE MANUEL     DD: 6/25/2024   DT: 7:23 AM

## 2024-06-25 NOTE — ASSESSMENT & PLAN NOTE
Admit TEG with 79.4% AA inhibition.   Repeat head CT stable, no platelet transfusion indicated per neurosurgery.

## 2024-06-25 NOTE — ASSESSMENT & PLAN NOTE
1.2 cm subarachnoid hemorrhage right temporal lobe.  Repeat head CT stable.  Non-operative management.  Post traumatic pharmacologic seizure prophylaxis for 1 week.  Speech Language Pathology cognitive evaluation completed. Recommend Intermittent assistance with IADLs .  Sekou Celaya MD. Neurosurgeon. [facility].

## 2024-06-25 NOTE — ASSESSMENT & PLAN NOTE
Blood alcohol level of 260 at [facility].  Per chart review history of withdrawal syndrome  Trauma alcohol withdrawal protocol initiated.  Alcohol withdrawal surveillance.

## 2024-06-25 NOTE — ASSESSMENT & PLAN NOTE
Initial lactic level at [facility] 5.3.  Repeat level 4.0  6/25 Resolved  Trend labs.

## 2024-06-25 NOTE — PROGRESS NOTES
Ht 70in  WT  62.1kg  Temp 97.6F temporal        4 Eyes Skin Assessment Completed by MENDY Stark and MENDY Li.    Head Incision Lac to fore head bilateral  Ears WDL  Nose Redness abrasion   Mouth WDL missing teeth not new  Neck Redness and Blanching  Breast/Chest Redness and Blanching  Shoulder Blades Redness abrasions, to bilateral shoulders, bruising to upper back and lower back  Spine Rednessabrasions, redness  (R) Arm/Elbow/Hand Redness, Blanching, and Scab blanching redness to elbows  (L) Arm/Elbow/Hand Redness, Blanching, and Scab  Abdomen WDL  Groin Redness and Blanching scrotum   Scrotum/Coccyx/Buttocks Redness and Blanching coccyx  (R) Leg Bruising  (L) Leg Bruising  (R) Heel/Foot/Toe Redness abrasion   (L) Heel/Foot/Toe Redness abrasion          Devices In Places ECG, Blood Pressure Cuff, Pulse Ox, and Nasal Cannula      Interventions In Place NC W/Ear Foams, Sacral Mepilex, TAP System, and Pillows    Possible Skin Injury Yes    Pictures Uploaded Into Epic Yes  Wound Consult Placed Yes  RN Wound Prevention Protocol Ordered Yes

## 2024-06-25 NOTE — CONSULTS
DATE OF SERVICE:  06/25/2024     NEUROSURGICAL CONSULTATION     HISTORY OF PRESENT ILLNESS:  The patient is a pleasant 69-year-old retired   , who is currently homeless.  He also has a history of ETOH abuse.  He   has had multiple falls, fell and tripped and hit his head 24-48 hours before   admission.  He did not seek medical attention because he did not feel like he   needed it and then he had chest pain and came to an outside hospital.  On   further questioning, the patient is not taking any aspirin, nonsteroidal   anti-inflammatories or other anticoagulants.     PAST MEDICAL HISTORY:  Tractor injury about 12 years ago, history of ETOH   withdrawal, 3 prior strokes.     PAST SURGICAL HISTORY:  Multiple orthopedic surgeries.     ALLERGIES:  None.     MEDICATIONS AT HOME:  Tylenol, Norvasc, Tums, Protonix.     FAMILY HISTORY:  Noncontributory.     SOCIAL HISTORY:  He is a retired , used to work for the [employer].  Quit smoking a year ago.  He reports current alcohol use.  No   drugs.     PHYSICAL EXAMINATION:  GENERAL:  Awake, alert and oriented x3, GCS of 15.  HEENT:  Pupils equal, round, reactive to light.  Extraocular muscles intact.    Tongue midline.  Face symmetric.  NEUROLOGIC:  Motor is 5/5 strength in all muscle groups in the upper and lower   extremities.  No drift.  Sensory grossly intact to light touch.     IMAGING STUDIES:  CT scan of the head noncontrast on repeat shows a stable   minimal intraparenchymal hemorrhage in the area of a prior right-sided   temporal stroke with significant encephalomalacia.  There is no mass effect.    There is no shift.  He does have significant atrophy.     LABORATORY VALUES:  CBC significant for white count 11.8, hemoglobin 10.9.    Remainder within normal limits.  Basic metabolic panel:  Sodium 132,   bicarbonate of 15, glucose of 102.  Lactate 4.0 last night.  ETOH was 100.5 on   presentation this morning.  INR and PTT are normal.  TEG is  normal.  AA   inhibition is 24%, ADP inhibition is 79%.     PLAN:  The patient has a stable head CT.  He does not need any reversal of his   ADP inhibition.  He needs Keppra 500 b.i.d. x7 days.  Neurosurgery will sign   off.  He can safely be transferred out of the floor, q.4 hours neuro checks,   q.4 hours vitals.  He can follow up with me in the office in 2 weeks with no   additional imaging.        ______________________________  Sekou Celaya MD    CPD/DAYRON    DD:  06/25/2024 08:43  DT:  06/25/2024 08:55    Job#:  [id number]

## 2024-06-25 NOTE — ASSESSMENT & PLAN NOTE
Recent EGD in March with esophagitis.   Chronic condition treated with protonix.  Resumed maintenance medication on admission.

## 2024-06-25 NOTE — CARE PLAN
The patient is Watcher - Medium risk of patient condition declining or worsening         Progress made toward(s) clinical / shift goals:      Problem: Knowledge Deficit - Standard  Goal: Patient and family/care givers will demonstrate understanding of plan of care, disease process/condition, diagnostic tests and medications  Outcome: Progressing     Problem: Fall Risk  Goal: Patient will remain free from falls  Outcome: Progressing     Problem: Optimal Care for Alcohol Withdrawal  Goal: Optimal Care for the alcohol withdrawal patient  Outcome: Progressing     Problem: Seizure Precautions  Goal: Implementation of seizure precautions  Outcome: Progressing     Problem: Lifestyle Changes  Goal: Patient's ability to identify lifestyle changes and available resources to help reduce recurrence of condition will improve  Outcome: Progressing     Problem: Psychosocial  Goal: Patient's level of anxiety will decrease  Outcome: Progressing  Goal: Spiritual and cultural needs incorporated into hospitalization  Outcome: Progressing     Problem: Risk for Aspiration  Goal: Patient's risk for aspiration will be absent or decrease  Outcome: Progressing       Patient is not progressing towards the following goals:    Problem: Pain - Standard  Goal: Alleviation of pain or a reduction in pain to the patient’s comfort goal  Outcome: Not Progressing

## 2024-06-25 NOTE — ED NOTES
Trauma green transfer from [place]. [facility] W/ [facility]. Fell 2 days ago, and once again tonight in [facility]. +ETOH, CO2 10, LA 5.3,

## 2024-06-25 NOTE — ASSESSMENT & PLAN NOTE
Reportedly fell multiple times within last 5 days. Fell again in group home last night and was taken to ED for evaluation.  Trauma Green Transfer Activation from [facility] in [place], [place].  America Eric MD. Trauma Surgery.

## 2024-06-25 NOTE — THERAPY
Occupational Therapy Contact Note    Patient Name: Sam Potts  Age:  69 y.o., Sex:  male  Medical Record #: [id number]  Today's Date: 6/25/2024 06/25/24 0745   Interdisciplinary Plan of Care Collaboration   Collaboration Comments OT orders received, pt pending work up, will hold until POC is established.

## 2024-06-25 NOTE — PROGRESS NOTES
Trauma / Surgical Daily Progress Note    Date of Service  6/25/2024    Chief Complaint  69 y.o. male admitted 6/25/2024 with right subarachnoid hemorrhage after sustaining a mechanical fall.     Interval Events  Admit to TICU today.   Tertiary negative.  GCS 15.  Admit TEG with 79.4% ADP inhibition & repeat head CT stable. No platelet transfusion indicated per neurosurgery.   Neurosurgery recs cleared for mixon transfer.    Anticipate mixon transfer tomorrow after 24 hours of serial neuro checks.    Review of Systems  Review of Systems   Constitutional:  Negative for chills, diaphoresis, fever and malaise/fatigue.   HENT:  Negative for ear discharge, ear pain, hearing loss and tinnitus.    Eyes:  Negative for blurred vision, double vision, photophobia and pain.   Respiratory:  Negative for shortness of breath.    Cardiovascular:  Negative for chest pain.   Gastrointestinal:  Negative for abdominal pain, nausea and vomiting.   Genitourinary: Negative.    Musculoskeletal:  Positive for joint pain (chronic bilateral shoulder pain) and myalgias. Negative for back pain and neck pain.   Skin: Negative.    Neurological:  Positive for headaches. Negative for dizziness, tingling, tremors, sensory change, speech change, focal weakness and weakness.        Vital Signs  Temp:  [36.3 °C (97.3 °F)-37.3 °C (99.1 °F)] 36.5 °C (97.7 °F)  Pulse:  [83-96] 94  Resp:  [10-18] 12  BP: (119-154)/(62-75) 119/73  SpO2:  [90 %-94 %] 91 %    Physical Exam  Physical Exam  Constitutional:       General: He is not in acute distress.  HENT:      Head: Normocephalic.      Comments: Superficial forehead lacerations     Right Ear: External ear normal.      Left Ear: External ear normal.      Mouth/Throat:      Mouth: Mucous membranes are moist.      Pharynx: Oropharynx is clear.   Eyes:      Extraocular Movements: Extraocular movements intact.      Pupils: Pupils are equal, round, and reactive to light.   Cardiovascular:      Rate and Rhythm:  Normal rate and regular rhythm.   Pulmonary:      Effort: Pulmonary effort is normal. No respiratory distress.      Breath sounds: Normal breath sounds.   Chest:      Chest wall: No tenderness.   Abdominal:      General: Bowel sounds are normal. There is no distension.      Palpations: Abdomen is soft.      Tenderness: There is no abdominal tenderness. There is no guarding.   Musculoskeletal:         General: No swelling, tenderness or deformity.      Cervical back: Normal range of motion and neck supple. No tenderness.      Right lower leg: No edema.      Left lower leg: No edema.   Skin:     General: Skin is warm and dry.      Capillary Refill: Capillary refill takes less than 2 seconds.   Neurological:      General: No focal deficit present.      Mental Status: He is alert and oriented to person, place, and time. Mental status is at baseline.      GCS: GCS eye subscore is 4. GCS verbal subscore is 5. GCS motor subscore is 6.      Sensory: No sensory deficit.      Motor: No weakness.      Comments: 5/5 strength bilateral upper extremities.   5/5 strength bilateral lower extremities.   Psychiatric:         Behavior: Behavior is cooperative.       Laboratory  Recent Results (from the past 24 hour(s))   CBC WITH DIFFERENTIAL    Collection Time: 06/25/24 12:50 AM   Result Value Ref Range    WBC 13.6 (H) 4.8 - 10.8 K/uL    RBC 4.34 (L) 4.70 - 6.10 M/uL    Hemoglobin 12.7 (L) 14.0 - 18.0 g/dL    Hematocrit 38.9 (L) 42.0 - 52.0 %    MCV 89.6 80.0 - 94.0 fL    MCH 29.3 28.7 - 33.1 pg    MCHC 32.6 (L) 33.0 - 37.0 g/dL    RDW 18.1 (H) 11.5 - 14.5 %    Platelet Count 224 130 - 400 K/uL    MPV 9.4 7.4 - 10.4 fL    Neutrophils Automated 84.4 (H) 39.0 - 70.0 %    Lymphocytes Automated 9.2 (L) 21.0 - 50.0 %    Monocytes Automated 6.2 1.7 - 10.0 %    Eosinophils Automated 0.0 0.0 - 5.0 %    Basophils Automated 0.2 0.0 - 3.0 %    Abs Neutrophils Automated 11.5 (H) 1.8 - 7.7 K/uL    Abs Lymph Automated 1.3 1.2 - 4.8 K/uL    Monos  (Absolute) 0.8 0.2 - 0.9 K/uL   COMP METABOLIC PANEL    Collection Time: 06/25/24 12:50 AM   Result Value Ref Range    Sodium 133 (L) 137 - 145 mmol/L    Potassium 4.4 3.5 - 5.1 mmol/L    Chloride 99 98 - 107 mmol/L    Co2 10 (LL) 22 - 30 mmol/L    Anion Gap 24 (H) 4 - 12 mmol/L    Glucose 97 70 - 100 mg/dL    Bun 21 (H) 9 - 20 mg/dL    Creatinine 1.3 0.7 - 1.3 mg/dL    Calcium 8.8 8.7 - 10.5 mg/dL    AST(SGOT) 63 (H) 15 - 46 U/L    ALT(SGPT) 27 13 - 69 U/L    Alkaline Phosphatase 145 (H) 38 - 126 U/L    Total Bilirubin 1.1 0.2 - 1.3 mg/dL    Albumin 4.6 3.5 - 5.0 g/dL    Total Protein 7.3 6.3 - 8.2 g/dL    A-G Ratio 1.7 1.0 - 2.0   APTT    Collection Time: 06/25/24 12:50 AM   Result Value Ref Range    APTT 25.1 21.8 - 33.8 sec   PROTHROMBIN TIME (INR)    Collection Time: 06/25/24 12:50 AM   Result Value Ref Range    PT 10.3 9.3 - 12.4 sec    INR 0.95    DIAGNOSTIC ALCOHOL    Collection Time: 06/25/24 12:50 AM   Result Value Ref Range    Ethyl Alcohol -Ethanol  Etoh 0.260 (H) 0.000 - 0.010 gm/dl   TROPONIN    Collection Time: 06/25/24 12:50 AM   Result Value Ref Range    Troponin I 0.01 0.00 - 0.04 ng/mL   ESTIMATED GFR    Collection Time: 06/25/24 12:50 AM   Result Value Ref Range    GFR (CKD-EPI) 60 >60 mL/min/1.73 m 2   LACTIC ACID    Collection Time: 06/25/24  2:10 AM   Result Value Ref Range    Lactic Acid 5.3 (HH) 0.0 - 2.0 mmol/L   VENOUS BLOOD GAS    Collection Time: 06/25/24  2:10 AM   Result Value Ref Range    Venous Bg Ph 7.32 (L) 7.35 - 7.45    Venous Bg Pco2 31.7 (L) 36.0 - 48.0 mmHg    Venous Bg Po2 37.0 mmHg    Venous Bg Hco3 15.8 (L) 24.0 - 28.0 mmol/L    Venous Bg Base Excess -9.2 (L) -3.0 - 3.0 mmol/L   MRSA SURVEILLANCE    Collection Time: 06/25/24  2:10 AM   Result Value Ref Range    Mrsa Screen NEGATIVE    URINALYSIS (UA)    Collection Time: 06/25/24  3:30 AM    Specimen: Urine   Result Value Ref Range    Color YELLOW     Character CLEAR     Specific Gravity 1.010 <1.035    Ph 6.0 5.0 - 8.0     Glucose NEGATIVE Negative mg/dL    Ketones 40 (A) Negative mg/dL    Protein NEGATIVE Negative mg/dL    Bilirubin NEGATIVE Negative    Urobilinogen, Urine 0.2 Negative    Nitrite NEGATIVE Negative    Leukocyte Esterase NEGATIVE Negative    Occult Blood TRACE (A) Negative   URINE MICROSCOPIC (W/UA)    Collection Time: 06/25/24  3:30 AM   Result Value Ref Range    WBC None Seen 0 - 6 /hpf    RBC None Seen 0 - 3 /hpf    Bacteria 6-25 (A) None /hpf    Epithelial Cells Rare /hpf    Urine Other See Below    LACTIC ACID    Collection Time: 06/25/24  4:00 AM   Result Value Ref Range    Lactic Acid 4.0 (HH) 0.0 - 2.0 mmol/L   DIAGNOSTIC ALCOHOL    Collection Time: 06/25/24  5:48 AM   Result Value Ref Range    Diagnostic Alcohol 100.5 (H) <10.1 mg/dL   CBC WITHOUT DIFFERENTIAL    Collection Time: 06/25/24  5:48 AM   Result Value Ref Range    WBC 11.8 (H) 4.8 - 10.8 K/uL    RBC 3.74 (L) 4.70 - 6.10 M/uL    Hemoglobin 10.9 (L) 14.0 - 18.0 g/dL    Hematocrit 33.4 (L) 42.0 - 52.0 %    MCV 89.3 81.4 - 97.8 fL    MCH 29.1 27.0 - 33.0 pg    MCHC 32.6 32.3 - 36.5 g/dL    RDW 59.2 (H) 35.9 - 50.0 fL    Platelet Count 210 164 - 446 K/uL    MPV 9.3 9.0 - 12.9 fL   PLATELET MAPPING WITH BASIC TEG    Collection Time: 06/25/24  5:48 AM   Result Value Ref Range    Reaction Time Initial-R 4.6 4.6 - 9.1 min    React Time Initial Hep 4.4 4.3 - 8.3 min    Clot Kinetics-K 1.3 0.8 - 2.1 min    Clot Angle-Angle 73.5 63.0 - 78.0 degrees    Maximum Clot Strength-MA 58.8 52.0 - 69.0 mm    TEG Functional Fibrinogen(MA) 17.5 15.0 - 32.0 mm    % Inhibition ADP 79.4 (H) 0.0 - 17.0 %    % Inhibition AA 24.0 (H) 0.0 - 11.0 %    TEG Algorithm Link Algorithm    Comp Metabolic Panel    Collection Time: 06/25/24  5:48 AM   Result Value Ref Range    Sodium 132 (L) 135 - 145 mmol/L    Potassium 3.8 3.6 - 5.5 mmol/L    Chloride 98 96 - 112 mmol/L    Co2 15 (L) 20 - 33 mmol/L    Anion Gap 19.0 (H) 7.0 - 16.0    Glucose 102 (H) 65 - 99 mg/dL    Bun 16 8 - 22 mg/dL     Creatinine 0.75 0.50 - 1.40 mg/dL    Calcium 8.0 (L) 8.5 - 10.5 mg/dL    Correct Calcium 8.5 8.5 - 10.5 mg/dL    AST(SGOT) 37 12 - 45 U/L    ALT(SGPT) 15 2 - 50 U/L    Alkaline Phosphatase 128 (H) 30 - 99 U/L    Total Bilirubin 0.8 0.1 - 1.5 mg/dL    Albumin 3.4 3.2 - 4.9 g/dL    Total Protein 5.8 (L) 6.0 - 8.2 g/dL    Globulin 2.4 1.9 - 3.5 g/dL    A-G Ratio 1.4 g/dL   COD - Adult (Type and Screen)    Collection Time: 06/25/24  5:48 AM   Result Value Ref Range    ABO Grouping Only O     Rh Grouping Only POS     Antibody Screen-Cod NEG    ESTIMATED GFR    Collection Time: 06/25/24  5:48 AM   Result Value Ref Range    GFR (CKD-EPI) 98 >60 mL/min/1.73 m 2   POCT glucose device results    Collection Time: 06/25/24  6:49 AM   Result Value Ref Range    POC Glucose, Blood 118 (H) 65 - 99 mg/dL       Fluids    Intake/Output Summary (Last 24 hours) at 6/25/2024 1059  Last data filed at 6/25/2024 0800  Gross per 24 hour   Intake 254.7 ml   Output 250 ml   Net 4.7 ml       Core Measures & Quality Metrics  Labs reviewed, Medications reviewed and Radiology images reviewed  Farah catheter: No Farah      DVT Prophylaxis: Contraindicated - High bleeding risk  DVT prophylaxis - mechanical: SCDs  Ulcer prophylaxis: Not indicated        RAP Score Total: 6    CAGE Results: not completed Blood Alcohol>0.08: yes CAGE Score: 2  Total: Incomplete  Assessment complete date: 6/25/2024  Intervention: Complete. Patient response to intervention:.   Patient demonstrates understanding of intervention. Patient agrees to follow-up.   has been contacted. Follow up with: PCP  Total ETOH intervention time: 15 - 30 mintues    Assessment/Plan  * Trauma- (present on admission)  Assessment & Plan  Reportedly fell multiple times within last 5 days. Fell again in [facility] last night and was taken to ED for evaluation.  Trauma Green Transfer Activation from [facility] in [place], [place].  America Eric MD. Trauma  Surgery.    Lactic acidosis- (present on admission)  Assessment & Plan  Initial lactic level at [facility] 5.3.  Repeat level 4.0  Trend labs.    Traumatic subarachnoid hemorrhage with unknown loss of consciousness status (HCC)- (present on admission)  Assessment & Plan  1.2 cm subarachnoid hemorrhage right temporal lobe.  Repeat head CT stable.  Non-operative management.  Post traumatic pharmacologic seizure prophylaxis for 1 week.  Speech Language Pathology cognitive evaluation.  Sekou Celaya MD. Neurosurgeon. [facility].    Iron deficiency anemia- (present on admission)  Assessment & Plan  Admit Hgb 10.9  Chronic condition treated with iron sulfate.  Resumed maintenance medication on admission.  Check iron studies.   Monitor Hgb & transfuse PRBCs for Hgb < 7.0    Coagulopathy (HCC)- (present on admission)  Assessment & Plan  Admit TEG with 79.4% AA inhibition.   Repeat head CT stable, no platelet transfusion indicated per neurosurgery.    History of rib fracture- (present on admission)  Assessment & Plan  Bilateral chronic rib fractures.   Aggressive pulmonary hygiene.    Nasal bone fractures- (present on admission)  Assessment & Plan  Bilateral nasal bone fractures, possibly chronic.  Non operative management.  Analgesia.    Contraindication to deep vein thrombosis (DVT) prophylaxis- (present on admission)  Assessment & Plan  VTE prophylaxis initially contraindicated secondary to elevated bleeding risk.  6/27 Trauma surveillance venous duplex ultrasonography ordered.    Alcohol use disorder- (present on admission)  Assessment & Plan  Blood alcohol level of 260 at [facility].  Per chart review history of withdrawal syndrome  Trauma alcohol withdrawal protocol initiated.  Alcohol withdrawal surveillance.    GERD with esophagitis- (present on admission)  Assessment & Plan  Recent EGD in March with esophagitis.   Chronic condition treated with protonix.  Resumed maintenance medication  on admission.    Benign essential HTN- (present on admission)  Assessment & Plan  Chronic condition treated with amlodipine.  Resumed maintenance medication on admission.    Forehead laceration, subsequent encounter- (present on admission)  Assessment & Plan  2 forehead lacerations.   Bacitracin.      Mental status adequate for full examination?: Yes    Spine cleared (radiologically and/or clinically): Yes    All current laboratory studies/radiology exams reviewed: Yes    Medications reconciliation has been reviewed: No, med rec pending.    Completed Consultations:  None     Pending Consultations:  Dr. Sekou Celaya MD, Neurosurgery    Newly identified diagnoses, injuries and/or co-morbidities:  None noted at time of exam.    PDI Score ~ not completed.    Discussed patient condition with RN, Pharmacy, Charge nurse / hot rounds, Patient, and trauma surgery, Dr. Reynolds.

## 2024-06-25 NOTE — PROGRESS NOTES
Medication history reviewed with PT at bedside    [facility] is complete per PT reporting    Allergies reviewed.     Patient denies any outpatient antibiotics in the last 30 days.     Patient is not taking anticoagulants.    PT confirms that he does not take any prescription medications or supplements at this time.    Preferred pharmacy for this visit - [facility] on [address] ([phone])

## 2024-06-25 NOTE — ASSESSMENT & PLAN NOTE
Admit Hgb 10.9  Chronic condition treated with iron sulfate.  Resumed maintenance medication on admission.  Check iron studies.   Monitor Hgb & transfuse PRBCs for Hgb < 7.0

## 2024-06-25 NOTE — ASSESSMENT & PLAN NOTE
VTE prophylaxis initially contraindicated secondary to elevated bleeding risk.  6/27 Trauma surveillance venous duplex ultrasonography ordered.

## 2024-06-25 NOTE — ED PROVIDER NOTES
ED Provider Note    CHIEF COMPLAINT  Chief Complaint   Patient presents with    Trauma Green     Trauma green transfer from [facility]. ICH W/ [facility]. Fell 2 days ago, and once again tonight in correction. +ETOH, CO2 10, LA 5.3,        EXTERNAL RECORDS REVIEWED  External ED Note note from earlier today from [facility].  Patient CTs of his head face and neck revealed small right-sided subarachnoid hemorrhage of 1.8 cm at the temporal lobe, and a nasal bone fracture.  CT cervical spine was negative    History mildly limited secondary to patient's alcohol intoxication    HPI/ROS    OUTSIDE HISTORIAN(S):  EMS report taken directly from EMS upon patient arrival, they report patient had a unremarkable transport and did not require any significant sedation    Sam Potts is a 69 y.o. male who presents transferred from outside hospital for traumatic ICH.  Patient with longstanding history of alcohol abuse and multiple falls secondary to this.  Patient has had recent rib fractures from this.  Patient had a fall 2 days ago, and then again today while in correction.  Patient has had multiple episodes of vomiting.  Patient had a CT upon evaluation at outside hospital which revealed a 1.8 cm right temporal subarachnoid hemorrhage.  Patient was transferred to our facility for further care.  He denies any use of anticoagulants or antiplatelets.  Patient without any associated neck pain.  He did have a CT face and CT C-spine which revealed a nasal bone fracture, no evidence of cervical fractures    PAST MEDICAL HISTORY   has a past medical history of Acid reflux, Benign essential HTN (10/30/2022), Blood transfusion without reported diagnosis, Chronic pain, Head ache, and Prostate enlargement.    SURGICAL HISTORY   has a past surgical history that includes other orthopedic surgery; shoulder surgery; colon resection; endoscopy procedure (3/5/2024); and colonoscopy,diagnostic (3/6/2024).    FAMILY HISTORY  Family History   Problem  "Relation Age of Onset    Heart Attack Father     Other Brother         multiple sclerosis    Alcohol/Drug Brother     Asthma Brother        SOCIAL HISTORY  Social History     Tobacco Use    Smoking status: Former     Current packs/day: 0.50     Types: Cigarettes    Smokeless tobacco: Never   Vaping Use    Vaping status: Never Used   Substance and Sexual Activity    Alcohol use: Yes     Comment: 1/2 quart    Drug use: No    Sexual activity: Not on file       CURRENT MEDICATIONS  Home Medications       Reviewed by Tim Malik R.N. (Registered Nurse) on 06/25/24 at 0548  Med List Status: Partial     Medication Last Dose Status   acetaminophen (TYLENOL) 325 MG Tab  Active   amLODIPine (NORVASC) 10 MG Tab  Active   calcium carbonate (TUMS) 500 MG Chew Tab  Active   ferrous sulfate 325 (65 Fe) MG tablet  Active   pantoprazole (PROTONIX) 40 MG Tablet Delayed Response  Active                    ALLERGIES  Allergies   Allergen Reactions    Apple Cider Vinegar Rash     Pt states he is allergic to all vinegar       PHYSICAL EXAM  VITAL SIGNS: /71   Pulse 92   Temp 37.3 °C (99.1 °F)   Resp 18   Ht 1.778 m (5' 10\")   Wt 74.8 kg (165 lb)   SpO2 94%   BMI 23.68 kg/m²    Physical Exam  Constitutional:       Appearance: Normal appearance.   HENT:      Head:      Comments: Bilateral lacerations to patient's forehead.  These are chronic appearing, patient states he sustained these 2 days ago.     Right Ear: Tympanic membrane normal.      Left Ear: Tympanic membrane normal.      Nose: Nose normal.      Mouth/Throat:      Mouth: Mucous membranes are moist.   Eyes:      Extraocular Movements: Extraocular movements intact.      Pupils: Pupils are equal, round, and reactive to light.   Cardiovascular:      Rate and Rhythm: Normal rate and regular rhythm.   Pulmonary:      Effort: Pulmonary effort is normal. No respiratory distress.      Breath sounds: Normal breath sounds. No stridor. No wheezing or rales.   Chest:     "  Chest wall: No tenderness.   Abdominal:      General: Abdomen is flat. There is no distension.      Palpations: Abdomen is soft. There is no mass.      Tenderness: There is no abdominal tenderness.   Musculoskeletal:      Cervical back: Normal range of motion.      Comments: C/T/L without any midline TTP or bony abn/stepoffs     No bony abn of clavicles, shoulders, elbows wrists and hands without any TTP or major ROM limitation; compartments soft    No chest TTP on compression    Abd without any TTP or ecchymosis    Pelvis is stable on compression    BL hips, knees ankle without TTP or major limitations of ROM; compartments soft    All distal pulses are intact     no focal motor or sensory deficit          Skin:     General: Skin is warm.      Capillary Refill: Capillary refill takes less than 2 seconds.   Neurological:      General: No focal deficit present.      Mental Status: He is alert and oriented to person, place, and time.      Comments: Moving all extremities.  No sensation deficits.  Normal speech.   Psychiatric:         Mood and Affect: Mood normal.           EKG/LABS  Results for orders placed or performed during the hospital encounter of 06/25/24   DIAGNOSTIC ALCOHOL   Result Value Ref Range    Diagnostic Alcohol 100.5 (H) <10.1 mg/dL   CBC WITHOUT DIFFERENTIAL   Result Value Ref Range    WBC 11.8 (H) 4.8 - 10.8 K/uL    RBC 3.74 (L) 4.70 - 6.10 M/uL    Hemoglobin 10.9 (L) 14.0 - 18.0 g/dL    Hematocrit 33.4 (L) 42.0 - 52.0 %    MCV 89.3 81.4 - 97.8 fL    MCH 29.1 27.0 - 33.0 pg    MCHC 32.6 32.3 - 36.5 g/dL    RDW 59.2 (H) 35.9 - 50.0 fL    Platelet Count 210 164 - 446 K/uL    MPV 9.3 9.0 - 12.9 fL   PLATELET MAPPING WITH BASIC TEG   Result Value Ref Range    Reaction Time Initial-R 4.6 4.6 - 9.1 min    React Time Initial Hep 4.4 4.3 - 8.3 min    Clot Kinetics-K 1.3 0.8 - 2.1 min    Clot Angle-Angle 73.5 63.0 - 78.0 degrees    Maximum Clot Strength-MA 58.8 52.0 - 69.0 mm    TEG Functional  Fibrinogen(MA) 17.5 15.0 - 32.0 mm    % Inhibition ADP 79.4 (H) 0.0 - 17.0 %    % Inhibition AA 24.0 (H) 0.0 - 11.0 %    TEG Algorithm Link Algorithm    Comp Metabolic Panel   Result Value Ref Range    Sodium 132 (L) 135 - 145 mmol/L    Potassium 3.8 3.6 - 5.5 mmol/L    Chloride 98 96 - 112 mmol/L    Co2 15 (L) 20 - 33 mmol/L    Anion Gap 19.0 (H) 7.0 - 16.0    Glucose 102 (H) 65 - 99 mg/dL    Bun 16 8 - 22 mg/dL    Creatinine 0.75 0.50 - 1.40 mg/dL    Calcium 8.0 (L) 8.5 - 10.5 mg/dL    Correct Calcium 8.5 8.5 - 10.5 mg/dL    AST(SGOT) 37 12 - 45 U/L    ALT(SGPT) 15 2 - 50 U/L    Alkaline Phosphatase 128 (H) 30 - 99 U/L    Total Bilirubin 0.8 0.1 - 1.5 mg/dL    Albumin 3.4 3.2 - 4.9 g/dL    Total Protein 5.8 (L) 6.0 - 8.2 g/dL    Globulin 2.4 1.9 - 3.5 g/dL    A-G Ratio 1.4 g/dL   COD - Adult (Type and Screen)   Result Value Ref Range    ABO Grouping Only O     Rh Grouping Only POS     Antibody Screen-Cod NEG    ESTIMATED GFR   Result Value Ref Range    GFR (CKD-EPI) 98 >60 mL/min/1.73 m 2   POCT glucose device results   Result Value Ref Range    POC Glucose, Blood 118 (H) 65 - 99 mg/dL       I have independently interpreted this EKG    RADIOLOGY/PROCEDURES   I have independently interpreted the diagnostic imaging associated with this visit and am waiting the final reading from the radiologist.   My preliminary interpretation is as follows: Small area of ICH of right temporal lobe    Radiologist interpretation:  CT-HEAD W/O   Final Result      1.  Mild cerebral atrophy as well as atrophy of the superior cerebellar vermis.   2.  Encephalomalacic changes in the right temporal lobe consistent with old infarct, chronic sequela of posttraumatic brain contusion, or other remote insult.   3.  Stable appearing 10 mm parenchymal hemorrhage in the right temporal lobe most consistent with hemorrhagic brain contusion. No change from outside films from earlier on today's date      Based solely on CT findings, the brain injury  guideline category is mBIG 3.      EDH   IVH   Displaced skull fx   SDH > 8mm   IPH > 8mm or multiple   SAH bi-hemispheric or > 3mm      The original BIG retrospective analysis found radiographic progression in 0% of BIG 1 patients and 2.6% BIG 2.            OUTSIDE IMAGES-DX CHEST   Final Result      OUTSIDE IMAGES-CT HEAD   Final Result      OUTSIDE IMAGES-CT CHEST   Final Result      OUTSIDE IMAGES-CT FACE   Final Result      OUTSIDE IMAGES-CT CERVICAL SPINE   Final Result          COURSE & MEDICAL DECISION MAKING    ASSESSMENT, COURSE AND PLAN  Care Narrative: Patient here with known subarachnoid hemorrhage.  INR intake sent.  I discussed the case with trauma team, I also discussed the case with neurosurgery.  Patient will be admitted as he qualifies as a big 3 given his alcohol laxation and size of the lesion.  Thankfully patient with reassuring exam otherwise.  The lacerations patient sustained on his forehead are greater than 48 hours old and therefore we will allow these to heal by secondary intention.  It did not appear that patient received his Tdap.  He is unsure of his last time he received this and therefore Tdap updated.  Labs notable for alcohol level of 100  I discussed the case with neurosurgery who will see patient.  Patient admitted to the trauma unit.            DISPOSITION AND DISCUSSIONS  I have discussed management of the patient with the following physicians and LUIS's: Trauma APRN, neurosurgery Dr. Boyd        FINAL DIAGNOSIS  1. Traumatic right-sided intracerebral hemorrhage with unknown loss of consciousness status, initial encounter ([facility])    2. Alcohol abuse    3. Closed fracture of nasal bone, initial encounter    4. Facial laceration, initial encounter

## 2024-06-26 PROBLEM — Z75.8 DISCHARGE PLANNING ISSUES: Status: ACTIVE | Noted: 2024-06-26

## 2024-06-26 PROBLEM — E87.20 LACTIC ACIDOSIS: Status: RESOLVED | Noted: 2024-06-25 | Resolved: 2024-06-26

## 2024-06-26 LAB
ABO + RH BLD: NORMAL
ALBUMIN SERPL BCP-MCNC: 3.4 G/DL (ref 3.2–4.9)
ALBUMIN/GLOB SERPL: 1.5 G/DL
ALP SERPL-CCNC: 126 U/L (ref 30–99)
ALT SERPL-CCNC: 17 U/L (ref 2–50)
ANION GAP SERPL CALC-SCNC: 11 MMOL/L (ref 7–16)
AST SERPL-CCNC: 34 U/L (ref 12–45)
BASOPHILS # BLD AUTO: 0.4 % (ref 0–1.8)
BASOPHILS # BLD: 0.04 K/UL (ref 0–0.12)
BILIRUB SERPL-MCNC: 0.7 MG/DL (ref 0.1–1.5)
BUN SERPL-MCNC: 8 MG/DL (ref 8–22)
CALCIUM ALBUM COR SERPL-MCNC: 8.9 MG/DL (ref 8.5–10.5)
CALCIUM SERPL-MCNC: 8.4 MG/DL (ref 8.5–10.5)
CHLORIDE SERPL-SCNC: 101 MMOL/L (ref 96–112)
CO2 SERPL-SCNC: 22 MMOL/L (ref 20–33)
CREAT SERPL-MCNC: 0.62 MG/DL (ref 0.5–1.4)
EOSINOPHIL # BLD AUTO: 0.1 K/UL (ref 0–0.51)
EOSINOPHIL NFR BLD: 1 % (ref 0–6.9)
ERYTHROCYTE [DISTWIDTH] IN BLOOD BY AUTOMATED COUNT: 58.4 FL (ref 35.9–50)
GFR SERPLBLD CREATININE-BSD FMLA CKD-EPI: 104 ML/MIN/1.73 M 2
GLOBULIN SER CALC-MCNC: 2.2 G/DL (ref 1.9–3.5)
GLUCOSE BLD STRIP.AUTO-MCNC: 140 MG/DL (ref 65–99)
GLUCOSE SERPL-MCNC: 110 MG/DL (ref 65–99)
HCT VFR BLD AUTO: 31.7 % (ref 42–52)
HGB BLD-MCNC: 10.5 G/DL (ref 14–18)
IMM GRANULOCYTES # BLD AUTO: 0.04 K/UL (ref 0–0.11)
IMM GRANULOCYTES NFR BLD AUTO: 0.4 % (ref 0–0.9)
LYMPHOCYTES # BLD AUTO: 1.62 K/UL (ref 1–4.8)
LYMPHOCYTES NFR BLD: 16.6 % (ref 22–41)
MAGNESIUM SERPL-MCNC: 2 MG/DL (ref 1.5–2.5)
MCH RBC QN AUTO: 29.7 PG (ref 27–33)
MCHC RBC AUTO-ENTMCNC: 33.1 G/DL (ref 32.3–36.5)
MCV RBC AUTO: 89.5 FL (ref 81.4–97.8)
MONOCYTES # BLD AUTO: 0.73 K/UL (ref 0–0.85)
MONOCYTES NFR BLD AUTO: 7.5 % (ref 0–13.4)
NEUTROPHILS # BLD AUTO: 7.23 K/UL (ref 1.82–7.42)
NEUTROPHILS NFR BLD: 74.1 % (ref 44–72)
NRBC # BLD AUTO: 0 K/UL
NRBC BLD-RTO: 0 /100 WBC (ref 0–0.2)
PLATELET # BLD AUTO: 157 K/UL (ref 164–446)
PMV BLD AUTO: 9.8 FL (ref 9–12.9)
POTASSIUM SERPL-SCNC: 3.8 MMOL/L (ref 3.6–5.5)
PROT SERPL-MCNC: 5.6 G/DL (ref 6–8.2)
RBC # BLD AUTO: 3.54 M/UL (ref 4.7–6.1)
SODIUM SERPL-SCNC: 134 MMOL/L (ref 135–145)
WBC # BLD AUTO: 9.8 K/UL (ref 4.8–10.8)

## 2024-06-26 PROCEDURE — 700102 HCHG RX REV CODE 250 W/ 637 OVERRIDE(OP): Performed by: NURSE PRACTITIONER

## 2024-06-26 PROCEDURE — 700102 HCHG RX REV CODE 250 W/ 637 OVERRIDE(OP)

## 2024-06-26 PROCEDURE — 770001 HCHG ROOM/CARE - MED/SURG/GYN PRIV*

## 2024-06-26 PROCEDURE — 82962 GLUCOSE BLOOD TEST: CPT

## 2024-06-26 PROCEDURE — 85025 COMPLETE CBC W/AUTO DIFF WBC: CPT

## 2024-06-26 PROCEDURE — 700111 HCHG RX REV CODE 636 W/ 250 OVERRIDE (IP): Mod: JZ

## 2024-06-26 PROCEDURE — A9270 NON-COVERED ITEM OR SERVICE: HCPCS | Performed by: SURGERY

## 2024-06-26 PROCEDURE — 700101 HCHG RX REV CODE 250

## 2024-06-26 PROCEDURE — HZ2ZZZZ DETOXIFICATION SERVICES FOR SUBSTANCE ABUSE TREATMENT: ICD-10-PCS

## 2024-06-26 PROCEDURE — 87040 BLOOD CULTURE FOR BACTERIA: CPT

## 2024-06-26 PROCEDURE — 83735 ASSAY OF MAGNESIUM: CPT

## 2024-06-26 PROCEDURE — A9270 NON-COVERED ITEM OR SERVICE: HCPCS

## 2024-06-26 PROCEDURE — 36415 COLL VENOUS BLD VENIPUNCTURE: CPT

## 2024-06-26 PROCEDURE — 99232 SBSQ HOSP IP/OBS MODERATE 35: CPT | Mod: FS | Performed by: SURGERY

## 2024-06-26 PROCEDURE — 80053 COMPREHEN METABOLIC PANEL: CPT

## 2024-06-26 PROCEDURE — A9270 NON-COVERED ITEM OR SERVICE: HCPCS | Performed by: NURSE PRACTITIONER

## 2024-06-26 PROCEDURE — 700105 HCHG RX REV CODE 258

## 2024-06-26 PROCEDURE — 700102 HCHG RX REV CODE 250 W/ 637 OVERRIDE(OP): Performed by: SURGERY

## 2024-06-26 RX ORDER — METAXALONE 800 MG/1
800 TABLET ORAL EVERY 8 HOURS
Status: CANCELLED | OUTPATIENT
Start: 2024-06-26

## 2024-06-26 RX ORDER — POTASSIUM CHLORIDE 20 MEQ/1
20 TABLET, EXTENDED RELEASE ORAL 2 TIMES DAILY
Status: DISCONTINUED | OUTPATIENT
Start: 2024-06-26 | End: 2024-06-27 | Stop reason: HOSPADM

## 2024-06-26 RX ORDER — LIDOCAINE 4 G/G
1-3 PATCH TOPICAL EVERY 24 HOURS
Status: DISCONTINUED | OUTPATIENT
Start: 2024-06-26 | End: 2024-06-27 | Stop reason: HOSPADM

## 2024-06-26 RX ORDER — GABAPENTIN 100 MG/1
100 CAPSULE ORAL EVERY 8 HOURS
Status: DISCONTINUED | OUTPATIENT
Start: 2024-06-26 | End: 2024-06-27 | Stop reason: HOSPADM

## 2024-06-26 RX ORDER — LORAZEPAM 1 MG/1
0.5 TABLET ORAL EVERY 4 HOURS PRN
Status: DISCONTINUED | OUTPATIENT
Start: 2024-06-26 | End: 2024-06-27 | Stop reason: HOSPADM

## 2024-06-26 RX ORDER — LORAZEPAM 1 MG/1
1 TABLET ORAL EVERY 4 HOURS PRN
Status: DISCONTINUED | OUTPATIENT
Start: 2024-06-26 | End: 2024-06-27 | Stop reason: HOSPADM

## 2024-06-26 RX ORDER — LORAZEPAM 2 MG/ML
1.5 INJECTION INTRAMUSCULAR
Status: DISCONTINUED | OUTPATIENT
Start: 2024-06-26 | End: 2024-06-27 | Stop reason: HOSPADM

## 2024-06-26 RX ORDER — ACETAMINOPHEN 325 MG/1
650 TABLET ORAL EVERY 6 HOURS
Status: DISCONTINUED | OUTPATIENT
Start: 2024-06-26 | End: 2024-06-27 | Stop reason: HOSPADM

## 2024-06-26 RX ORDER — LORAZEPAM 2 MG/ML
2 INJECTION INTRAMUSCULAR
Status: DISCONTINUED | OUTPATIENT
Start: 2024-06-26 | End: 2024-06-27 | Stop reason: HOSPADM

## 2024-06-26 RX ORDER — LORAZEPAM 2 MG/ML
1 INJECTION INTRAMUSCULAR
Status: DISCONTINUED | OUTPATIENT
Start: 2024-06-26 | End: 2024-06-27 | Stop reason: HOSPADM

## 2024-06-26 RX ORDER — METHOCARBAMOL 750 MG/1
750 TABLET, FILM COATED ORAL 4 TIMES DAILY
Status: DISCONTINUED | OUTPATIENT
Start: 2024-06-26 | End: 2024-06-27 | Stop reason: HOSPADM

## 2024-06-26 RX ORDER — LORAZEPAM 2 MG/1
2 TABLET ORAL
Status: DISCONTINUED | OUTPATIENT
Start: 2024-06-26 | End: 2024-06-27 | Stop reason: HOSPADM

## 2024-06-26 RX ORDER — LORAZEPAM 2 MG/1
4 TABLET ORAL
Status: DISCONTINUED | OUTPATIENT
Start: 2024-06-26 | End: 2024-06-27 | Stop reason: HOSPADM

## 2024-06-26 RX ORDER — LORAZEPAM 2 MG/ML
0.5 INJECTION INTRAMUSCULAR EVERY 4 HOURS PRN
Status: DISCONTINUED | OUTPATIENT
Start: 2024-06-26 | End: 2024-06-27 | Stop reason: HOSPADM

## 2024-06-26 RX ADMIN — POTASSIUM CHLORIDE 20 MEQ: 1500 TABLET, EXTENDED RELEASE ORAL at 10:47

## 2024-06-26 RX ADMIN — GABAPENTIN 100 MG: 100 CAPSULE ORAL at 20:10

## 2024-06-26 RX ADMIN — LEVETIRACETAM 500 MG: 500 TABLET, FILM COATED ORAL at 16:37

## 2024-06-26 RX ADMIN — METHOCARBAMOL 750 MG: 750 TABLET ORAL at 10:47

## 2024-06-26 RX ADMIN — OMEPRAZOLE 20 MG: 20 CAPSULE, DELAYED RELEASE ORAL at 05:12

## 2024-06-26 RX ADMIN — ACETAMINOPHEN 650 MG: 325 TABLET, FILM COATED ORAL at 11:07

## 2024-06-26 RX ADMIN — AMLODIPINE BESYLATE 10 MG: 10 TABLET ORAL at 05:11

## 2024-06-26 RX ADMIN — METHOCARBAMOL 750 MG: 750 TABLET ORAL at 16:37

## 2024-06-26 RX ADMIN — METHOCARBAMOL 750 MG: 750 TABLET ORAL at 13:28

## 2024-06-26 RX ADMIN — Medication 100 MG: at 05:12

## 2024-06-26 RX ADMIN — POTASSIUM CHLORIDE 20 MEQ: 1500 TABLET, EXTENDED RELEASE ORAL at 16:37

## 2024-06-26 RX ADMIN — GABAPENTIN 100 MG: 100 CAPSULE ORAL at 13:28

## 2024-06-26 RX ADMIN — BACITRACIN ZINC: 500 OINTMENT TOPICAL at 16:37

## 2024-06-26 RX ADMIN — LIDOCAINE 3 PATCH: 4 PATCH TOPICAL at 10:48

## 2024-06-26 RX ADMIN — SODIUM CHLORIDE 250 MG: 9 INJECTION, SOLUTION INTRAVENOUS at 05:21

## 2024-06-26 RX ADMIN — OXYCODONE HYDROCHLORIDE 5 MG: 5 TABLET ORAL at 20:10

## 2024-06-26 RX ADMIN — ACETAMINOPHEN 650 MG: 325 TABLET, FILM COATED ORAL at 16:37

## 2024-06-26 RX ADMIN — OXYCODONE HYDROCHLORIDE 5 MG: 5 TABLET ORAL at 05:11

## 2024-06-26 RX ADMIN — LEVETIRACETAM 500 MG: 500 TABLET, FILM COATED ORAL at 05:12

## 2024-06-26 RX ADMIN — SODIUM CHLORIDE 250 MG: 9 INJECTION, SOLUTION INTRAVENOUS at 17:32

## 2024-06-26 RX ADMIN — FOLIC ACID 1 MG: 1 TABLET ORAL at 05:12

## 2024-06-26 RX ADMIN — OXYCODONE HYDROCHLORIDE 5 MG: 5 TABLET ORAL at 11:07

## 2024-06-26 RX ADMIN — BACITRACIN ZINC: 500 OINTMENT TOPICAL at 05:23

## 2024-06-26 RX ADMIN — FERROUS SULFATE TAB 325 MG (65 MG ELEMENTAL FE) 325 MG: 325 (65 FE) TAB at 08:09

## 2024-06-26 RX ADMIN — METHOCARBAMOL 750 MG: 750 TABLET ORAL at 20:10

## 2024-06-26 RX ADMIN — OXYCODONE HYDROCHLORIDE 5 MG: 5 TABLET ORAL at 08:07

## 2024-06-26 RX ADMIN — THERA TABS 1 TABLET: TAB at 05:12

## 2024-06-26 ASSESSMENT — ENCOUNTER SYMPTOMS
FEVER: 0
VOMITING: 0
COUGH: 0
HEADACHES: 1
CHILLS: 0
ROS GI COMMENTS: LAST BM 6/25
SHORTNESS OF BREATH: 0
MYALGIAS: 1
NAUSEA: 0
ABDOMINAL PAIN: 0

## 2024-06-26 ASSESSMENT — PAIN DESCRIPTION - PAIN TYPE
TYPE: ACUTE PAIN;OTHER (COMMENT)
TYPE: ACUTE PAIN
TYPE: ACUTE PAIN
TYPE: ACUTE PAIN;OTHER (COMMENT)
TYPE: ACUTE PAIN
TYPE: ACUTE PAIN;CHRONIC PAIN;OTHER (COMMENT)
TYPE: ACUTE PAIN
TYPE: ACUTE PAIN;OTHER (COMMENT)
TYPE: ACUTE PAIN
TYPE: ACUTE PAIN

## 2024-06-26 ASSESSMENT — LIFESTYLE VARIABLES
DOES PATIENT WANT TO TALK TO SOMEONE ABOUT QUITTING: YES
EVER FELT BAD OR GUILTY ABOUT YOUR DRINKING: NO
SUBSTANCE_ABUSE: 1
DOES PATIENT WANT TO STOP DRINKING: YES
HAVE PEOPLE ANNOYED YOU BY CRITICIZING YOUR DRINKING: NO
TOTAL SCORE: 1
EVER HAD A DRINK FIRST THING IN THE MORNING TO STEADY YOUR NERVES TO GET RID OF A HANGOVER: NO
TOTAL SCORE: 1
HOW MANY TIMES IN THE PAST YEAR HAVE YOU HAD 5 OR MORE DRINKS IN A DAY: 150
ALCOHOL_USE: YES
HAVE YOU EVER FELT YOU SHOULD CUT DOWN ON YOUR DRINKING: YES
AVERAGE NUMBER OF DAYS PER WEEK YOU HAVE A DRINK CONTAINING ALCOHOL: 6
CONSUMPTION TOTAL: POSITIVE
TOTAL SCORE: 1
ON A TYPICAL DAY WHEN YOU DRINK ALCOHOL HOW MANY DRINKS DO YOU HAVE: 4

## 2024-06-26 ASSESSMENT — FIBROSIS 4 INDEX
FIB4 SCORE: 4.14
FIB4 SCORE: 3.57

## 2024-06-26 NOTE — CARE PLAN
The patient is Stable - Low risk of patient condition declining or worsening    Shift Goals  Clinical Goals: Pulmonary hygiene; Stable or improving neuro assessment; Manage pain  Patient Goals: Manage pain; Brush teeth  Family Goals: LIZZIE - no family present    Progress made toward(s) clinical / shift goals:      Problem: Fall Risk  Goal: Patient will remain free from falls  Outcome: Progressing  Problem: Optimal Care for Alcohol Withdrawal  Goal: Optimal Care for the alcohol withdrawal patient  Outcome: Progressing  Problem: Seizure Precautions  Goal: Implementation of seizure precautions  Outcome: Progressing  Problem: Psychosocial  Goal: Patient's level of anxiety will decrease  Outcome: Progressing  Goal: Spiritual and cultural needs incorporated into hospitalization  Outcome: Progressing  Problem: Risk for Aspiration  Goal: Patient's risk for aspiration will be absent or decrease  Outcome: Progressing  Problem: Psychosocial  Goal: Patient's level of anxiety will decrease  Outcome: Progressing  Goal: Spiritual and cultural needs incorporated into hospitalization  Outcome: Progressing  Goal: Patient's ability to verbalize feelings about condition will improve  Outcome: Progressing  Goal: Patient's ability to re-evaluate and adapt role responsibilities will improve  Outcome: Progressing  Goal: Patient and family will demonstrate ability to cope with life altering diagnosis and/or procedure  Outcome: Progressing  Problem: Hemodynamics  Goal: Patient's hemodynamics, fluid balance and neurologic status will be stable or improve  Outcome: Progressing  Problem: Venous Thromboembolism (VTE) Prevention  Goal: The patient will remain free from venous thromboembolism (VTE)  Outcome: Progressing  Problem: Nutrition  Goal: Patient's nutritional and fluid intake will be adequate or improve  Outcome: Progressing  Problem: Urinary Elimination  Goal: Establish and maintain regular urinary output  Outcome: Progressing  Problem:  Bowel Elimination  Goal: Establish and maintain regular bowel function  Outcome: Progressing  Problem: Neuro Status  Goal: Neuro status will remain stable or improve  Outcome: Progressing  Problem: Self Care  Goal: Patient will have the ability to perform ADLs independently or with assistance (bathe, groom, dress, toilet and feed)  Outcome: Progressing  Problem: Mobility  Goal: Patient's capacity to carry out activities will improve  Outcome: Progressing       Patient is not progressing towards the following goals:    Problem: Pain - Standard  Goal: Alleviation of pain or a reduction in pain to the patient’s comfort goal  Outcome: Not Progressing  Problem: Knowledge Deficit - Standard  Goal: Patient and family/care givers will demonstrate understanding of plan of care, disease process/condition, diagnostic tests and medications  Outcome: Not Progressing  Problem: Lifestyle Changes  Goal: Patient's ability to identify lifestyle changes and available resources to help reduce recurrence of condition will improve  Outcome: Not Progressing  Problem: Respiratory  Goal: Patient will achieve/maintain optimum respiratory ventilation and gas exchange  Outcome: Not Progressing

## 2024-06-26 NOTE — THERAPY
"Occupational Therapy   Initial Evaluation     Patient Name: Sam Potts  Age:  69 y.o., Sex:  male  Medical Record #: [id number]  Today's Date: 6/25/2024     Precautions  Precautions: (P) Fall Risk  Comments: (P) hx of ETOH    Assessment  Patient is 69 y.o. male who presents to acute after he fell in custodial resulting in ICH. PMH includes ETOH. Pt able to demo BADLs at SBA/SPV level. Appears to be close to functional baseline. No further acute OT needs noted at this time.     Plan    Occupational Therapy Initial Treatment Plan   Treatment Interventions: (P) Self Care / Activities of Daily Living  Duration: (P) Discharge Needs Only    DC Equipment Recommendations: (P) None  Discharge Recommendations: (P) Anticipate that the patient will have no further occupational therapy needs after discharge from the hospital     Subjective    \"I sleep on the ground\"     Objective      Initial Contact Note    Initial Contact Note Order Received and Verified, Occupational Therapy Evaluation in Progress with Full Report to Follow.   Prior Living Situation   Prior Services None   Housing / Facility Homeless   Equipment Owned None   Comments Pt has been going back and fourth from custodial to the streets. Reports at this time his stuff is being held at a motel he has stayed at before   Prior Level of ADL Function   Self Feeding Independent   Grooming / Hygiene Independent   Bathing Independent   Dressing Independent   Toileting Independent   Prior Level of IADL Function   Medication Management Independent   Laundry Independent   Kitchen Mobility Independent   Finances Independent   Home Management Independent   Shopping Independent   Prior Level Of Mobility Independent Without Device in Community;Independent Without Device in Home   Driving / Transportation Walks;Utilizes Public Transportation   Comments reports he does not typically stay in one place, does not have a specific shower spot   Precautions   Precautions Fall Risk   Comments hx of " ETOH   Vitals   O2 Delivery Device None - Room Air   Pain 0 - 10 Group   Therapist Pain Assessment Post Activity Pain Same as Prior to Activity;Nurse Notified  (c/o chest and head pain, not quantified, agreeable to session)   Cognition    Cognition / Consciousness X   Level of Consciousness Alert   Comments flat affect, self directed, does not appear to internalize new ed   Active ROM Upper Body   Comments WFL   Strength Upper Body   Comments WFL   Coordination Upper Body   Comments WFL   Balance Assessment   Sitting Balance (Static) Fair +   Sitting Balance (Dynamic) Fair +   Standing Balance (Static) Fair   Standing Balance (Dynamic) Fair   Weight Shift Sitting Fair   Weight Shift Standing Fair   Comments no AD, reaches for external support, reports he wont use a cane of FWW at DC   Bed Mobility    Supine to Sit Supervised   Sit to Supine   (in chair post)   Scooting Supervised   ADL Assessment   Grooming Supervision;Standing  (washed hands)   Lower Body Dressing Supervision   Toileting Supervision   How much help from another person does the patient currently need...   Putting on and taking off regular lower body clothing? 4   Bathing (including washing, rinsing, and drying)? 4   Toileting, which includes using a toilet, bedpan, or urinal? 4   Putting on and taking off regular upper body clothing? 4   Taking care of personal grooming such as brushing teeth? 4   Eating meals? 4   6 Clicks Daily Activity Score 24   Functional Mobility   Sit to Stand Supervised   Bed, Chair, Wheelchair Transfer Supervised   Toilet Transfers Supervised   Mobility within room and bathroom, no AD   Edema / Skin Assessment   Comments cut over eyebrow   Activity Tolerance   Sitting in Chair 10 min (up post)   Sitting Edge of Bed 5 min   Standing 6 min   Comments no overt s/s of fatigue   Short Term Goals   Short Term Goal # 1 Pt will have no further acute OT needs at time of DC   Education Group   Role of Occupational Therapist Patient  Response Patient;Acceptance;Explanation;Demonstration;Verbal Demonstration;Action Demonstration   Occupational Therapy Initial Treatment Plan    Treatment Interventions Self Care / Activities of Daily Living   Duration Discharge Needs Only   Problem List   Problem List Decreased Active Daily Living Skills;Decreased Homemaking Skills   Anticipated Discharge Equipment and Recommendations   DC Equipment Recommendations None   Discharge Recommendations Anticipate that the patient will have no further occupational therapy needs after discharge from the hospital   Interdisciplinary Plan of Care Collaboration   IDT Collaboration with  Nursing;Physical Therapist   Patient Position at End of Therapy Seated;Chair Alarm On;Call Light within Reach;Tray Table within Reach;Phone within Reach   Collaboration Comments report given   Session Information   Date / Session Number  6/25, DC needs only           Pt seen for OT eval in coordination with PT due to the need for two skilled therapists due to limited mobility

## 2024-06-26 NOTE — PROGRESS NOTES
Trauma / Surgical Daily Progress Note    Date of Service  6/26/2024    Chief Complaint  69 y.o. male admitted 6/25/2024 with right subarachnoid hemorrhage after sustaining a mechanical fall.     Interval Events  No critical events overnight  A/Ox4.   Mild headache.  Room air. IS [id number]    - Stable for transfer to Neuro.  - Aggressive pulmonary hygiene  - Mobilize out of bed and into chair for all meals. Ambulate in the hallway.  - No anticoagulation until Neurosurgery approves.    Disposition: Patient is homeless. Case management involved.    Review of Systems  Review of Systems   Constitutional:  Negative for chills, fever and malaise/fatigue.   Respiratory:  Negative for cough and shortness of breath.    Gastrointestinal:  Negative for abdominal pain, nausea and vomiting.        Last BM 6/25   Musculoskeletal:  Positive for joint pain (chronic bilateral shoulder pain) and myalgias.   Neurological:  Positive for headaches.   Psychiatric/Behavioral:  Positive for substance abuse.    All other systems reviewed and are negative.       Vital Signs  Temp:  [36.2 °C (97.2 °F)-36.7 °C (98.1 °F)] 36.7 °C (98.1 °F)  Pulse:  [] 91  Resp:  [9-75] 18  BP: (106-159)/(61-86) 112/75  SpO2:  [89 %-98 %] 91 %    Physical Exam  Physical Exam  Vitals and nursing note reviewed.   Constitutional:       General: He is not in acute distress.     Appearance: He is not ill-appearing.   HENT:      Head:      Comments: Superficial forehead lacerations     Nose: Nose normal.      Mouth/Throat:      Mouth: Mucous membranes are moist.      Pharynx: Oropharynx is clear.   Eyes:      Conjunctiva/sclera: Conjunctivae normal.      Pupils: Pupils are equal, round, and reactive to light.   Cardiovascular:      Rate and Rhythm: Normal rate and regular rhythm.      Pulses: Normal pulses.   Pulmonary:      Effort: Pulmonary effort is normal. No respiratory distress.      Breath sounds: Normal breath sounds.   Abdominal:      General: Bowel  sounds are normal. There is no distension.      Palpations: Abdomen is soft.      Tenderness: There is no abdominal tenderness.   Musculoskeletal:         General: Normal range of motion.      Cervical back: Normal range of motion and neck supple. No tenderness.   Skin:     General: Skin is warm and dry.      Capillary Refill: Capillary refill takes less than 2 seconds.   Neurological:      General: No focal deficit present.      Mental Status: He is alert.      GCS: GCS eye subscore is 4. GCS verbal subscore is 5. GCS motor subscore is 6.      Sensory: Sensation is intact.      Motor: Motor function is intact.   Psychiatric:         Mood and Affect: Mood normal.         Behavior: Behavior normal. Behavior is cooperative.         Judgment: Judgment normal.         Laboratory  Recent Results (from the past 24 hour(s))   POCT glucose device results    Collection Time: 06/25/24  5:08 PM   Result Value Ref Range    POC Glucose, Blood 92 65 - 99 mg/dL   POCT glucose device results    Collection Time: 06/25/24  8:50 PM   Result Value Ref Range    POC Glucose, Blood 128 (H) 65 - 99 mg/dL   ABO Rh Confirm    Collection Time: 06/26/24  5:00 AM   Result Value Ref Range    ABO Rh Confirm O POS    CBC with Differential: Tomorrow AM    Collection Time: 06/26/24  5:00 AM   Result Value Ref Range    WBC 9.8 4.8 - 10.8 K/uL    RBC 3.54 (L) 4.70 - 6.10 M/uL    Hemoglobin 10.5 (L) 14.0 - 18.0 g/dL    Hematocrit 31.7 (L) 42.0 - 52.0 %    MCV 89.5 81.4 - 97.8 fL    MCH 29.7 27.0 - 33.0 pg    MCHC 33.1 32.3 - 36.5 g/dL    RDW 58.4 (H) 35.9 - 50.0 fL    Platelet Count 157 (L) 164 - 446 K/uL    MPV 9.8 9.0 - 12.9 fL    Neutrophils-Polys 74.10 (H) 44.00 - 72.00 %    Lymphocytes 16.60 (L) 22.00 - 41.00 %    Monocytes 7.50 0.00 - 13.40 %    Eosinophils 1.00 0.00 - 6.90 %    Basophils 0.40 0.00 - 1.80 %    Immature Granulocytes 0.40 0.00 - 0.90 %    Nucleated RBC 0.00 0.00 - 0.20 /100 WBC    Neutrophils (Absolute) 7.23 1.82 - 7.42 K/uL     Lymphs (Absolute) 1.62 1.00 - 4.80 K/uL    Monos (Absolute) 0.73 0.00 - 0.85 K/uL    Eos (Absolute) 0.10 0.00 - 0.51 K/uL    Baso (Absolute) 0.04 0.00 - 0.12 K/uL    Immature Granulocytes (abs) 0.04 0.00 - 0.11 K/uL    NRBC (Absolute) 0.00 K/uL   Comp Metabolic Panel (CMP): Tomorrow AM    Collection Time: 06/26/24  5:00 AM   Result Value Ref Range    Sodium 134 (L) 135 - 145 mmol/L    Potassium 3.8 3.6 - 5.5 mmol/L    Chloride 101 96 - 112 mmol/L    Co2 22 20 - 33 mmol/L    Anion Gap 11.0 7.0 - 16.0    Glucose 110 (H) 65 - 99 mg/dL    Bun 8 8 - 22 mg/dL    Creatinine 0.62 0.50 - 1.40 mg/dL    Calcium 8.4 (L) 8.5 - 10.5 mg/dL    Correct Calcium 8.9 8.5 - 10.5 mg/dL    AST(SGOT) 34 12 - 45 U/L    ALT(SGPT) 17 2 - 50 U/L    Alkaline Phosphatase 126 (H) 30 - 99 U/L    Total Bilirubin 0.7 0.1 - 1.5 mg/dL    Albumin 3.4 3.2 - 4.9 g/dL    Total Protein 5.6 (L) 6.0 - 8.2 g/dL    Globulin 2.2 1.9 - 3.5 g/dL    A-G Ratio 1.5 g/dL   ESTIMATED GFR    Collection Time: 06/26/24  5:00 AM   Result Value Ref Range    GFR (CKD-EPI) 104 >60 mL/min/1.73 m 2   MAGNESIUM    Collection Time: 06/26/24  5:00 AM   Result Value Ref Range    Magnesium 2.0 1.5 - 2.5 mg/dL   POCT glucose device results    Collection Time: 06/26/24  7:32 AM   Result Value Ref Range    POC Glucose, Blood 140 (H) 65 - 99 mg/dL       Fluids    Intake/Output Summary (Last 24 hours) at 6/26/2024 1410  Last data filed at 6/26/2024 1200  Gross per 24 hour   Intake 2467.71 ml   Output 1450 ml   Net 1017.71 ml       Core Measures & Quality Metrics  Labs reviewed, Medications reviewed and Radiology images reviewed  Farah catheter: No Farah      DVT Prophylaxis: Contraindicated - High bleeding risk  DVT prophylaxis - mechanical: SCDs  Ulcer prophylaxis: Not indicated    Assessed for rehab: Patient returned to prior level of function, rehabilitation not indicated at this time    RAP Score Total: 6    CAGE Results: positive Blood Alcohol>0.08: yes CAGE Score: 1  Total:  POSITIVE  Assessment complete date: 6/25/2024  Intervention: Complete. Patient response to intervention: wants to quit.   Patient demonstrates understanding of intervention. Patient agrees to follow-up.   has been contacted. Follow up with: PCP  Total ETOH intervention time: 15 - 30 mintues      Assessment/Plan  * Trauma- (present on admission)  Assessment & Plan  Reportedly fell multiple times within last 5 days. Fell again in long term last night and was taken to ED for evaluation.  Trauma Green Transfer Activation from [facility] in [place], [place].  America Eric MD. Trauma Surgery.    Discharge planning issues- (present on admission)  Assessment & Plan  Date of admission: 6/25/2024.  6/26 Transfer orders from TICU.  Cleared for discharge: No.  Discharge delayed: No.  Discharge date: tbd.  Possible homeless? Case management will investigate further    Traumatic subarachnoid hemorrhage with unknown loss of consciousness status (HCC)- (present on admission)  Assessment & Plan  1.2 cm subarachnoid hemorrhage right temporal lobe.  Repeat head CT stable.  Non-operative management.  Post traumatic pharmacologic seizure prophylaxis for 1 week.  Speech Language Pathology cognitive evaluation completed. Recommend Intermittent assistance with IADLs .  Sekou Celaya MD. Neurosurgeon. [facility].    Iron deficiency anemia- (present on admission)  Assessment & Plan  Admit Hgb 10.9  Chronic condition treated with iron sulfate.  Resumed maintenance medication on admission.  Check iron studies.   Monitor Hgb & transfuse PRBCs for Hgb < 7.0    Coagulopathy (HCC)- (present on admission)  Assessment & Plan  Admit TEG with 79.4% AA inhibition.   Repeat head CT stable, no platelet transfusion indicated per neurosurgery.    History of rib fracture- (present on admission)  Assessment & Plan  Bilateral chronic rib fractures.   Aggressive pulmonary hygiene.    Nasal bone fractures-  (present on admission)  Assessment & Plan  Bilateral nasal bone fractures, possibly chronic.  Non operative management.  Analgesia.    Contraindication to deep vein thrombosis (DVT) prophylaxis- (present on admission)  Assessment & Plan  VTE prophylaxis initially contraindicated secondary to elevated bleeding risk.  6/27 Trauma surveillance venous duplex ultrasonography ordered.    Alcohol use disorder- (present on admission)  Assessment & Plan  Blood alcohol level of 260 at [facility].  Per chart review history of withdrawal syndrome  Trauma alcohol withdrawal protocol initiated.  Alcohol withdrawal surveillance.    GERD with esophagitis- (present on admission)  Assessment & Plan  Recent EGD in March with esophagitis.   Chronic condition treated with protonix.  Resumed maintenance medication on admission.    Benign essential HTN- (present on admission)  Assessment & Plan  Chronic condition treated with amlodipine.  Resumed maintenance medication on admission.    Forehead laceration, subsequent encounter- (present on admission)  Assessment & Plan  2 forehead lacerations.   Bacitracin.        Discussed patient condition with RN, Patient, and trauma surgery, Dr. Reynolds.

## 2024-06-26 NOTE — ASSESSMENT & PLAN NOTE
Date of admission: 6/25/2024.  6/26 Transfer orders from TICU.  Cleared for discharge: No.  Discharge delayed: No.  Discharge date: tbd.  Possible homeless? Case management will investigate further

## 2024-06-26 NOTE — PROGRESS NOTES
"Inconclusive blood culture results show \"aerobic gram negative cocci\" but \"unable to identify the organism.\" MD Eric updated. New orders received and implemented.  "

## 2024-06-26 NOTE — PROGRESS NOTES
Report called, RN unavailable at this time. Report given to Gabbie. Pt transferred by wheelchair to new room with all of his belongings with transport.

## 2024-06-26 NOTE — CARE PLAN
The patient is Watcher - Medium risk of patient condition declining or worsening    Shift Goals  Clinical Goals: Pulmonary hygiene; Stable or improving neuro assessment; Manage pain  Patient Goals: Manage pain; Brush teeth  Family Goals: LIZZIE - no family present    Progress made toward(s) clinical / shift goals:    Problem: Pain - Standard  Goal: Alleviation of pain or a reduction in pain to the patient’s comfort goal  Outcome: Progressing     Problem: Knowledge Deficit - Standard  Goal: Patient and family/care givers will demonstrate understanding of plan of care, disease process/condition, diagnostic tests and medications  Outcome: Progressing     Problem: Fall Risk  Goal: Patient will remain free from falls  Outcome: Progressing     Problem: Optimal Care for Alcohol Withdrawal  Goal: Optimal Care for the alcohol withdrawal patient  Outcome: Progressing     Problem: Seizure Precautions  Goal: Implementation of seizure precautions  Outcome: Progressing     Problem: Psychosocial  Goal: Patient's level of anxiety will decrease  Outcome: Progressing  Goal: Spiritual and cultural needs incorporated into hospitalization  Outcome: Progressing     Problem: Risk for Aspiration  Goal: Patient's risk for aspiration will be absent or decrease  Outcome: Progressing       Patient is not progressing towards the following goals:

## 2024-06-27 ENCOUNTER — PHARMACY VISIT (OUTPATIENT)
Dept: PHARMACY | Facility: MEDICAL CENTER | Age: 69
End: 2024-06-27
Payer: COMMERCIAL

## 2024-06-27 ENCOUNTER — HOSPITAL ENCOUNTER (INPATIENT)
Dept: RADIOLOGY | Facility: MEDICAL CENTER | Age: 69
End: 2024-06-27
Attending: SURGERY
Payer: MEDICARE

## 2024-06-27 VITALS
OXYGEN SATURATION: 94 % | RESPIRATION RATE: 18 BRPM | WEIGHT: 145.94 LBS | DIASTOLIC BLOOD PRESSURE: 76 MMHG | HEART RATE: 82 BPM | HEIGHT: 70 IN | SYSTOLIC BLOOD PRESSURE: 118 MMHG | BODY MASS INDEX: 20.89 KG/M2 | TEMPERATURE: 98.6 F

## 2024-06-27 LAB
ALBUMIN SERPL BCP-MCNC: 3.2 G/DL (ref 3.2–4.9)
ALBUMIN/GLOB SERPL: 1.4 G/DL
ALP SERPL-CCNC: 154 U/L (ref 30–99)
ALT SERPL-CCNC: 16 U/L (ref 2–50)
ANION GAP SERPL CALC-SCNC: 9 MMOL/L (ref 7–16)
AST SERPL-CCNC: 28 U/L (ref 12–45)
BACTERIA BLD CULT: NORMAL
BACTERIA BLD CULT: NORMAL
BASOPHILS # BLD AUTO: 0.6 % (ref 0–1.8)
BASOPHILS # BLD: 0.05 K/UL (ref 0–0.12)
BILIRUB SERPL-MCNC: 0.3 MG/DL (ref 0.1–1.5)
BUN SERPL-MCNC: 9 MG/DL (ref 8–22)
CALCIUM ALBUM COR SERPL-MCNC: 9 MG/DL (ref 8.5–10.5)
CALCIUM SERPL-MCNC: 8.4 MG/DL (ref 8.5–10.5)
CHLORIDE SERPL-SCNC: 101 MMOL/L (ref 96–112)
CO2 SERPL-SCNC: 24 MMOL/L (ref 20–33)
CREAT SERPL-MCNC: 0.82 MG/DL (ref 0.5–1.4)
EOSINOPHIL # BLD AUTO: 0.19 K/UL (ref 0–0.51)
EOSINOPHIL NFR BLD: 2.2 % (ref 0–6.9)
ERYTHROCYTE [DISTWIDTH] IN BLOOD BY AUTOMATED COUNT: 60.1 FL (ref 35.9–50)
GFR SERPLBLD CREATININE-BSD FMLA CKD-EPI: 96 ML/MIN/1.73 M 2
GLOBULIN SER CALC-MCNC: 2.3 G/DL (ref 1.9–3.5)
GLUCOSE SERPL-MCNC: 104 MG/DL (ref 65–99)
HCT VFR BLD AUTO: 31.5 % (ref 42–52)
HGB BLD-MCNC: 10.1 G/DL (ref 14–18)
IMM GRANULOCYTES # BLD AUTO: 0.03 K/UL (ref 0–0.11)
IMM GRANULOCYTES NFR BLD AUTO: 0.3 % (ref 0–0.9)
LYMPHOCYTES # BLD AUTO: 1.75 K/UL (ref 1–4.8)
LYMPHOCYTES NFR BLD: 20.2 % (ref 22–41)
MAGNESIUM SERPL-MCNC: 1.8 MG/DL (ref 1.5–2.5)
MCH RBC QN AUTO: 29.4 PG (ref 27–33)
MCHC RBC AUTO-ENTMCNC: 32.1 G/DL (ref 32.3–36.5)
MCV RBC AUTO: 91.6 FL (ref 81.4–97.8)
MONOCYTES # BLD AUTO: 0.85 K/UL (ref 0–0.85)
MONOCYTES NFR BLD AUTO: 9.8 % (ref 0–13.4)
NEUTROPHILS # BLD AUTO: 5.8 K/UL (ref 1.82–7.42)
NEUTROPHILS NFR BLD: 66.9 % (ref 44–72)
NRBC # BLD AUTO: 0 K/UL
NRBC BLD-RTO: 0 /100 WBC (ref 0–0.2)
PHOSPHATE SERPL-MCNC: 2.7 MG/DL (ref 2.5–4.5)
PLATELET # BLD AUTO: 155 K/UL (ref 164–446)
PMV BLD AUTO: 10.8 FL (ref 9–12.9)
POTASSIUM SERPL-SCNC: 4.2 MMOL/L (ref 3.6–5.5)
PROT SERPL-MCNC: 5.5 G/DL (ref 6–8.2)
RBC # BLD AUTO: 3.44 M/UL (ref 4.7–6.1)
SIGNIFICANT IND 70042: NORMAL
SIGNIFICANT IND 70042: NORMAL
SITE SITE: NORMAL
SITE SITE: NORMAL
SODIUM SERPL-SCNC: 134 MMOL/L (ref 135–145)
SOURCE SOURCE: NORMAL
SOURCE SOURCE: NORMAL
WBC # BLD AUTO: 8.7 K/UL (ref 4.8–10.8)

## 2024-06-27 PROCEDURE — 700105 HCHG RX REV CODE 258

## 2024-06-27 PROCEDURE — 85025 COMPLETE CBC W/AUTO DIFF WBC: CPT

## 2024-06-27 PROCEDURE — 84100 ASSAY OF PHOSPHORUS: CPT

## 2024-06-27 PROCEDURE — 80053 COMPREHEN METABOLIC PANEL: CPT

## 2024-06-27 PROCEDURE — A9270 NON-COVERED ITEM OR SERVICE: HCPCS

## 2024-06-27 PROCEDURE — 700102 HCHG RX REV CODE 250 W/ 637 OVERRIDE(OP)

## 2024-06-27 PROCEDURE — RXMED WILLOW AMBULATORY MEDICATION CHARGE: Performed by: REGISTERED NURSE

## 2024-06-27 PROCEDURE — 36415 COLL VENOUS BLD VENIPUNCTURE: CPT

## 2024-06-27 PROCEDURE — A9270 NON-COVERED ITEM OR SERVICE: HCPCS | Performed by: NURSE PRACTITIONER

## 2024-06-27 PROCEDURE — A9270 NON-COVERED ITEM OR SERVICE: HCPCS | Performed by: SURGERY

## 2024-06-27 PROCEDURE — 700102 HCHG RX REV CODE 250 W/ 637 OVERRIDE(OP): Performed by: SURGERY

## 2024-06-27 PROCEDURE — 700102 HCHG RX REV CODE 250 W/ 637 OVERRIDE(OP): Performed by: NURSE PRACTITIONER

## 2024-06-27 PROCEDURE — 99239 HOSP IP/OBS DSCHRG MGMT >30: CPT | Performed by: REGISTERED NURSE

## 2024-06-27 PROCEDURE — 700111 HCHG RX REV CODE 636 W/ 250 OVERRIDE (IP): Mod: JZ

## 2024-06-27 PROCEDURE — 83735 ASSAY OF MAGNESIUM: CPT

## 2024-06-27 RX ORDER — OMEPRAZOLE 20 MG/1
20 CAPSULE, DELAYED RELEASE ORAL DAILY
Qty: 30 CAPSULE | Refills: 0 | Status: SHIPPED | OUTPATIENT
Start: 2024-06-28 | End: 2024-07-28

## 2024-06-27 RX ORDER — AMLODIPINE BESYLATE 10 MG/1
10 TABLET ORAL DAILY
Qty: 30 TABLET | Refills: 0 | Status: ON HOLD | OUTPATIENT
Start: 2024-06-28 | End: 2024-07-25

## 2024-06-27 RX ORDER — LEVETIRACETAM 500 MG/1
500 TABLET ORAL EVERY 12 HOURS
Qty: 8 TABLET | Refills: 0 | Status: SHIPPED | OUTPATIENT
Start: 2024-06-27 | End: 2024-07-01

## 2024-06-27 RX ADMIN — FOLIC ACID 1 MG: 1 TABLET ORAL at 05:47

## 2024-06-27 RX ADMIN — ACETAMINOPHEN 650 MG: 325 TABLET, FILM COATED ORAL at 05:47

## 2024-06-27 RX ADMIN — METHOCARBAMOL 750 MG: 750 TABLET ORAL at 09:03

## 2024-06-27 RX ADMIN — SODIUM CHLORIDE 250 MG: 9 INJECTION, SOLUTION INTRAVENOUS at 05:53

## 2024-06-27 RX ADMIN — OXYCODONE HYDROCHLORIDE 5 MG: 5 TABLET ORAL at 05:47

## 2024-06-27 RX ADMIN — FERROUS SULFATE TAB 325 MG (65 MG ELEMENTAL FE) 325 MG: 325 (65 FE) TAB at 09:03

## 2024-06-27 RX ADMIN — ACETAMINOPHEN 650 MG: 325 TABLET, FILM COATED ORAL at 00:14

## 2024-06-27 RX ADMIN — BACITRACIN ZINC: 500 OINTMENT TOPICAL at 05:48

## 2024-06-27 RX ADMIN — LEVETIRACETAM 500 MG: 500 TABLET, FILM COATED ORAL at 05:48

## 2024-06-27 RX ADMIN — AMLODIPINE BESYLATE 10 MG: 10 TABLET ORAL at 05:48

## 2024-06-27 RX ADMIN — OMEPRAZOLE 20 MG: 20 CAPSULE, DELAYED RELEASE ORAL at 05:48

## 2024-06-27 RX ADMIN — THERA TABS 1 TABLET: TAB at 05:47

## 2024-06-27 RX ADMIN — GABAPENTIN 100 MG: 100 CAPSULE ORAL at 05:47

## 2024-06-27 RX ADMIN — Medication 100 MG: at 05:47

## 2024-06-27 RX ADMIN — POTASSIUM CHLORIDE 20 MEQ: 1500 TABLET, EXTENDED RELEASE ORAL at 05:47

## 2024-06-27 ASSESSMENT — PAIN DESCRIPTION - PAIN TYPE: TYPE: ACUTE PAIN

## 2024-06-27 NOTE — CARE PLAN
The patient is Stable - Low risk of patient condition declining or worsening    Shift Goals  Clinical Goals: neuro monitoring and pain management  Patient Goals: pain management  Family Goals: LIZZIE    Progress made toward(s) clinical / shift goals:    Problem: Pain - Standard  Goal: Alleviation of pain or a reduction in pain to the patient’s comfort goal  Outcome: Progressing  Note: Patient medicated per MAR       Problem: Fall Risk  Goal: Patient will remain free from falls  Outcome: Progressing  Note: Fall precautions in place. Bed locked and in the lowest position. Call light in reach. Strip alarm in use.      Problem: Neuro Status  Goal: Neuro status will remain stable or improve  Outcome: Progressing  Note: Q 4 neuro checks in place. Patient A&O x 4. Full sensation intact.        Patient is not progressing towards the following goals: N/A

## 2024-06-27 NOTE — DISCHARGE INSTRUCTIONS
- Call or seek medical attention for questions or concerns  - Follow up with the [facility] RETURN: as needed  - Follow up with Dr. Celaya in 2 weeks time, avoid all blood thinners including aspirin or NSAIDs (ibuprophen, Advil, Aleve, Motrin) for at least two weeks  - Follow up with primary care provider within one weeks time  - Resume regular diet  - May take over the counter acetaminophen  - Continue daily over the counter stool softener while on narcotics  - No operation of machinery or motorized vehicles while under the influence of narcotics  - No alcohol, marijuana or illicit drug use while under the influence of narcotics  - In the event of a narcotic overdose naloxone (Narcan) is available without a prescription from any [facility] or [facility]  - No swimming, hot tubs, baths or wound submersion until cleared by outpatient provider. May shower  - No contact sports, strenuous activities, or heavy lifting until cleared by outpatient provider  - If respiratory decompensation, persistent or worsening pain, or signs or symptoms of infection occur seek medical attention

## 2024-06-27 NOTE — DISCHARGE SUMMARY
Trauma Discharge Summary    DATE OF ADMISSION: 6/25/2024    DATE OF DISCHARGE: 6/27/2024    LENGTH OF STAY: 2 days    ATTENDING PHYSICIAN: America Eric M.D.    CONSULTING PHYSICIAN:   1.  Sekou Celaya MD neurosurgery    DISCHARGE DIAGNOSIS:  Principal Problem:    Trauma  Active Problems:    Traumatic subarachnoid hemorrhage with unknown loss of consciousness status (HCC)    Discharge planning issues    Benign essential HTN    GERD with esophagitis    Alcohol use disorder    Contraindication to deep vein thrombosis (DVT) prophylaxis    Nasal bone fractures    History of rib fracture    Coagulopathy (HCC)    Iron deficiency anemia    Forehead laceration, subsequent encounter  Resolved Problems:    Lactic acidosis      PROCEDURES:  1.  None    HISTORY OF PRESENT ILLNESS: The patient is a 69 y.o. male who was reportedly injured after multiple ground-level falls.  The patient was evaluated at [facility] in [facility] and subsequently was transferred to [facility] in [place], [place].    HOSPITAL COURSE: The patient was triaged as a trauma consult activation.  Primary and secondary survey completed in the emergency department.  Patient sustained traumatic subarachnoid hemorrhage in the setting of acute alcohol intoxication.  Neurosurgery was consulted the patient was transported to trauma ICU.  Patient had a repeat interval CT of the head which was stable.  Patient was treated with nonoperative management and pharmacologic seizure prophylaxis for 1 week.  Patient was evaluated by physical and Occupational Therapy with no recommendations for postacute services.  Patient was evaluated by speech-language pathology recommending intermittent assistance with IADLs.  Patient had an uncomplicated hospital course.  On day of discharge patient does admit that he is homeless and he declines any resources for local shelters.  He is requesting resources for alcohol cessation  these were provided by .  Additionally he was provided a Uber ride back to [place] per his request.  Patient was discharged with home hypertension medication, home GERD medication, and seizure prophylaxis.  He was instructed to follow-up with neurosurgery in 2 weeks for follow-up imaging.  Additionally I have instructed him to follow-up with his PCP in 1 to 2 weeks.  The patient verbalized understanding of discharge and follow-up instructions and all questions were answered.    HOSPITAL PROBLEM LIST:  * Trauma- (present on admission)  Assessment & Plan  Reportedly fell multiple times within last 5 days. Fell again in FDC last night and was taken to ED for evaluation.  Trauma Green Transfer Activation from [facility] in [place], [place].  America Eric MD. Trauma Surgery.    Discharge planning issues- (present on admission)  Assessment & Plan  Date of admission: 6/25/2024.  6/26 Transfer orders from TICU.  Cleared for discharge: No.  Discharge delayed: No.  Discharge date: tbd.  Possible homeless? Case management will investigate further    Traumatic subarachnoid hemorrhage with unknown loss of consciousness status (HCC)- (present on admission)  Assessment & Plan  1.2 cm subarachnoid hemorrhage right temporal lobe.  Repeat head CT stable.  Non-operative management.  Post traumatic pharmacologic seizure prophylaxis for 1 week.  Speech Language Pathology cognitive evaluation completed. Recommend Intermittent assistance with IADLs .  Sekou Celaya MD. Neurosurgeon. [facility].    Iron deficiency anemia- (present on admission)  Assessment & Plan  Admit Hgb 10.9  Chronic condition treated with iron sulfate.  Resumed maintenance medication on admission.  Check iron studies.   Monitor Hgb & transfuse PRBCs for Hgb < 7.0    Coagulopathy (HCC)- (present on admission)  Assessment & Plan  Admit TEG with 79.4% AA inhibition.   Repeat head CT stable, no platelet  transfusion indicated per neurosurgery.    History of rib fracture- (present on admission)  Assessment & Plan  Bilateral chronic rib fractures.   Aggressive pulmonary hygiene.    Nasal bone fractures- (present on admission)  Assessment & Plan  Bilateral nasal bone fractures, possibly chronic.  Non operative management.  Analgesia.    Contraindication to deep vein thrombosis (DVT) prophylaxis- (present on admission)  Assessment & Plan  VTE prophylaxis initially contraindicated secondary to elevated bleeding risk.  6/27 Trauma surveillance venous duplex ultrasonography ordered.    Alcohol use disorder- (present on admission)  Assessment & Plan  Blood alcohol level of 260 at [facility].  Per chart review history of withdrawal syndrome  Trauma alcohol withdrawal protocol initiated.  Alcohol withdrawal surveillance.    GERD with esophagitis- (present on admission)  Assessment & Plan  Recent EGD in March with esophagitis.   Chronic condition treated with protonix.  Resumed maintenance medication on admission.    Benign essential HTN- (present on admission)  Assessment & Plan  Chronic condition treated with amlodipine.  Resumed maintenance medication on admission.    Forehead laceration, subsequent encounter- (present on admission)  Assessment & Plan  2 forehead lacerations.   Bacitracin.    Lactic acidosis-resolved as of 6/26/2024, (present on admission)  Assessment & Plan  Initial lactic level at [facility] 5.3.  Repeat level 4.0  6/25 Resolved  Trend labs.          DISPOSITION: Discharged to [facility] on 6/27/2024. The patient was counseled and questions were answered. Specifically, signs and symptoms of infection, respiratory decompensation, and persistent or worsening pain were discussed and the patient agrees to seek medical attention if any of these develop.    DISCHARGE MEDICATIONS:  The patients controlled substance history was reviewed and a controlled substance use informed consent (if applicable) was  provided by [facility] and the patient has been prescribed.     Medication List        START taking these medications        Instructions   levETIRAcetam 500 MG Tabs  Commonly known as: Keppra   Take 1 Tablet by mouth every 12 hours for 4 days.  Dose: 500 mg     omeprazole 20 MG delayed-release capsule  Start taking on: June 28, 2024  Commonly known as: PriLOSEC  Replaces: pantoprazole 40 MG Tbec   Take 1 Capsule by mouth every day for 30 days.  Dose: 20 mg            CONTINUE taking these medications        Instructions   acetaminophen 500 MG Tabs  Commonly known as: Tylenol   Take 1,000 mg by mouth every 6 hours as needed for Moderate Pain.  Dose: 1,000 mg     amLODIPine 10 MG Tabs  Start taking on: June 28, 2024  Commonly known as: Norvasc   Take 1 Tablet by mouth every day for 30 days.  Dose: 10 mg            STOP taking these medications      ibuprofen 200 MG Tabs  Commonly known as: Motrin     pantoprazole 40 MG Tbec  Commonly known as: Protonix  Replaced by: omeprazole 20 MG delayed-release capsule              ACTIVITY:  As tolerated, abstain from alcohol use    WOUND CARE:  Bacitracin to abrasions as needed.    DIET:  Orders Placed This Encounter   Procedures    Diet Order Diet: Regular     Standing Status:   Standing     Number of Occurrences:   1     Order Specific Question:   Diet:     Answer:   Regular [1]       FOLLOW UP:  Sekou Celaya M.D.  [address] [facility] [id number] [phone]    Follow up  Follow up in 2 weeks for repeat imaging.    Jorgito Ferguson D.O.  [address] [address]  [phone]    Follow up in 1 week(s)        TIME SPENT ON DISCHARGE: 51 minutes      ____________________________________________  ASHLEY Joyce    DD: 6/27/2024 9:55 AM

## 2024-06-27 NOTE — DISCHARGE PLANNING
"Care Transition Team Final Discharge Disposition    Actual Discharge Information  Discharge Disposition: Discharged to home/self care (01)    Pt to discharge today. Pt is homeless. Social determinants of health updated in AVS. Per Pt he receives couple of thousand dollars every month and plans to find a place to stay. PT said \" For now I just want to go back there somewhere in [place]\". RN JUD asked for a specific address Pt said he will go to [facility] in [place]. RN CM to assist Pt for UBER ride once ready to dc.     1215- CM  informed to assist set up UBER ride for Pt. Bedside nurse informed.  "

## 2024-06-28 ENCOUNTER — TELEPHONE (OUTPATIENT)
Dept: HEALTH INFORMATION MANAGEMENT | Facility: OTHER | Age: 69
End: 2024-06-28
Payer: MEDICARE

## 2024-07-01 LAB
BACTERIA BLD CULT: NORMAL
BACTERIA BLD CULT: NORMAL
SIGNIFICANT IND 70042: NORMAL
SIGNIFICANT IND 70042: NORMAL
SITE SITE: NORMAL
SITE SITE: NORMAL
SOURCE SOURCE: NORMAL
SOURCE SOURCE: NORMAL

## 2024-07-23 PROBLEM — R29.6 FREQUENT FALLS: Status: ACTIVE | Noted: 2024-07-23

## 2024-07-23 PROBLEM — R27.0 ATAXIA: Status: ACTIVE | Noted: 2024-07-23

## 2024-07-23 PROBLEM — F10.920 ALCOHOLIC INTOXICATION WITHOUT COMPLICATION (HCC): Status: ACTIVE | Noted: 2024-07-23

## 2025-02-12 PROBLEM — E87.29 ALCOHOLIC KETOACIDOSIS: Status: ACTIVE | Noted: 2025-02-12

## 2025-02-13 PROBLEM — F10.929 ALCOHOLIC INTOXICATION WITH COMPLICATION (HCC): Status: ACTIVE | Noted: 2024-07-23
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
## 2025-03-06 PROBLEM — D75.89 MACROCYTOSIS WITHOUT ANEMIA: Status: ACTIVE | Noted: 2025-03-06
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
## 2025-03-07 PROBLEM — E87.6 HYPOKALEMIA: Status: RESOLVED | Noted: 2022-10-30 | Resolved: 2025-03-07

## 2025-03-07 PROBLEM — R74.01 TRANSAMINITIS: Status: RESOLVED | Noted: 2022-10-30 | Resolved: 2025-03-07
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
## 2025-03-07 PROBLEM — D75.89 MACROCYTOSIS WITHOUT ANEMIA: Status: RESOLVED | Noted: 2025-03-06 | Resolved: 2025-03-07

## 2025-03-07 PROBLEM — S06.6XAA TRAUMATIC SUBARACHNOID HEMORRHAGE WITH UNKNOWN LOSS OF CONSCIOUSNESS STATUS (HCC): Status: RESOLVED | Noted: 2024-06-25 | Resolved: 2025-03-07

## 2025-03-07 PROBLEM — E87.29 ALCOHOLIC KETOACIDOSIS: Status: RESOLVED | Noted: 2025-02-12 | Resolved: 2025-03-07

## 2025-04-01 PROBLEM — D53.9 MACROCYTIC ANEMIA: Status: ACTIVE | Noted: 2025-04-01

## 2025-04-01 PROBLEM — R79.89 ELEVATED LACTIC ACID LEVEL: Status: ACTIVE | Noted: 2025-04-01

## 2025-04-01 PROBLEM — R09.02 HYPOXIA: Status: ACTIVE | Noted: 2025-04-01

## 2025-04-01 PROBLEM — E87.1 HYPONATREMIA: Status: ACTIVE | Noted: 2025-04-01

## 2025-04-01 PROBLEM — K92.2 GI BLEED: Status: ACTIVE | Noted: 2025-04-01

## 2025-04-01 PROBLEM — K92.1 MELENA: Status: ACTIVE | Noted: 2025-04-01

## 2025-04-02 ENCOUNTER — HOSPITAL ENCOUNTER (OUTPATIENT)
Dept: RADIOLOGY | Facility: MEDICAL CENTER | Age: 70
End: 2025-04-02

## 2025-04-02 ENCOUNTER — HOSPITAL ENCOUNTER (INPATIENT)
Facility: MEDICAL CENTER | Age: 70
LOS: 6 days | DRG: 357 | End: 2025-04-08
Attending: HOSPITALIST | Admitting: STUDENT IN AN ORGANIZED HEALTH CARE EDUCATION/TRAINING PROGRAM
Payer: MEDICARE

## 2025-04-02 ENCOUNTER — APPOINTMENT (OUTPATIENT)
Dept: RADIOLOGY | Facility: MEDICAL CENTER | Age: 70
DRG: 357 | End: 2025-04-02
Attending: STUDENT IN AN ORGANIZED HEALTH CARE EDUCATION/TRAINING PROGRAM
Payer: MEDICARE

## 2025-04-02 DIAGNOSIS — K92.2 UGIB (UPPER GASTROINTESTINAL BLEED): ICD-10-CM

## 2025-04-02 DIAGNOSIS — K63.1 DUODENAL PERFORATION (HCC): ICD-10-CM

## 2025-04-02 PROBLEM — S22.49XA RIB FRACTURES: Status: ACTIVE | Noted: 2024-02-18

## 2025-04-02 PROBLEM — D62 ACUTE BLOOD LOSS ANEMIA: Status: ACTIVE | Noted: 2025-04-02

## 2025-04-02 LAB
ABO GROUP BLD: NORMAL
ALBUMIN SERPL BCP-MCNC: 3 G/DL (ref 3.2–4.9)
ALBUMIN/GLOB SERPL: 1.5 G/DL
ALP SERPL-CCNC: 74 U/L (ref 30–99)
ALT SERPL-CCNC: 9 U/L (ref 2–50)
ANION GAP SERPL CALC-SCNC: 8 MMOL/L (ref 7–16)
ANISOCYTOSIS BLD QL SMEAR: ABNORMAL
AST SERPL-CCNC: 22 U/L (ref 12–45)
BASOPHILS # BLD AUTO: 0.1 % (ref 0–1.8)
BASOPHILS # BLD: 0.01 K/UL (ref 0–0.12)
BILIRUB SERPL-MCNC: 0.7 MG/DL (ref 0.1–1.5)
BLD GP AB SCN SERPL QL: NORMAL
BUN SERPL-MCNC: 17 MG/DL (ref 8–22)
CALCIUM ALBUM COR SERPL-MCNC: 8.6 MG/DL (ref 8.5–10.5)
CALCIUM SERPL-MCNC: 7.8 MG/DL (ref 8.5–10.5)
CHLORIDE SERPL-SCNC: 105 MMOL/L (ref 96–112)
CO2 SERPL-SCNC: 19 MMOL/L (ref 20–33)
COMMENT 1642: NORMAL
CREAT SERPL-MCNC: 0.68 MG/DL (ref 0.5–1.4)
EOSINOPHIL # BLD AUTO: 0.02 K/UL (ref 0–0.51)
EOSINOPHIL NFR BLD: 0.2 % (ref 0–6.9)
ERYTHROCYTE [DISTWIDTH] IN BLOOD BY AUTOMATED COUNT: 70.3 FL (ref 35.9–50)
GFR SERPLBLD CREATININE-BSD FMLA CKD-EPI: 101 ML/MIN/1.73 M 2
GLOBULIN SER CALC-MCNC: 2 G/DL (ref 1.9–3.5)
GLUCOSE SERPL-MCNC: 103 MG/DL (ref 65–99)
HCT VFR BLD AUTO: 27.3 % (ref 42–52)
HGB BLD-MCNC: 9.3 G/DL (ref 14–18)
HOLDING TUBE BB 8507: NORMAL
IMM GRANULOCYTES # BLD AUTO: 0.09 K/UL (ref 0–0.11)
IMM GRANULOCYTES NFR BLD AUTO: 1 % (ref 0–0.9)
LACTATE SERPL-SCNC: 0.8 MMOL/L (ref 0.5–2)
LYMPHOCYTES # BLD AUTO: 1.19 K/UL (ref 1–4.8)
LYMPHOCYTES NFR BLD: 13.3 % (ref 22–41)
MACROCYTES BLD QL SMEAR: ABNORMAL
MCH RBC QN AUTO: 31.3 PG (ref 27–33)
MCHC RBC AUTO-ENTMCNC: 34.1 G/DL (ref 32.3–36.5)
MCV RBC AUTO: 91.9 FL (ref 81.4–97.8)
MICROCYTES BLD QL SMEAR: ABNORMAL
MONOCYTES # BLD AUTO: 0.62 K/UL (ref 0–0.85)
MONOCYTES NFR BLD AUTO: 6.9 % (ref 0–13.4)
MORPHOLOGY BLD-IMP: NORMAL
NEUTROPHILS # BLD AUTO: 7.01 K/UL (ref 1.82–7.42)
NEUTROPHILS NFR BLD: 78.5 % (ref 44–72)
NRBC # BLD AUTO: 0.02 K/UL
NRBC BLD-RTO: 0.2 /100 WBC (ref 0–0.2)
OVALOCYTES BLD QL SMEAR: NORMAL
PLATELET # BLD AUTO: 165 K/UL (ref 164–446)
PLATELET BLD QL SMEAR: NORMAL
PMV BLD AUTO: 8.6 FL (ref 9–12.9)
POIKILOCYTOSIS BLD QL SMEAR: NORMAL
POLYCHROMASIA BLD QL SMEAR: NORMAL
POTASSIUM SERPL-SCNC: 3.2 MMOL/L (ref 3.6–5.5)
PROT SERPL-MCNC: 5 G/DL (ref 6–8.2)
RBC # BLD AUTO: 2.97 M/UL (ref 4.7–6.1)
RBC BLD AUTO: PRESENT
RH BLD: NORMAL
SODIUM SERPL-SCNC: 132 MMOL/L (ref 135–145)
WBC # BLD AUTO: 8.9 K/UL (ref 4.8–10.8)
WBC TOXIC VACUOLES BLD QL SMEAR: NORMAL

## 2025-04-02 PROCEDURE — 85025 COMPLETE CBC W/AUTO DIFF WBC: CPT | Mod: 91

## 2025-04-02 PROCEDURE — A9270 NON-COVERED ITEM OR SERVICE: HCPCS | Performed by: STUDENT IN AN ORGANIZED HEALTH CARE EDUCATION/TRAINING PROGRAM

## 2025-04-02 PROCEDURE — 80053 COMPREHEN METABOLIC PANEL: CPT | Mod: 91

## 2025-04-02 PROCEDURE — 36415 COLL VENOUS BLD VENIPUNCTURE: CPT

## 2025-04-02 PROCEDURE — 86900 BLOOD TYPING SEROLOGIC ABO: CPT

## 2025-04-02 PROCEDURE — 71045 X-RAY EXAM CHEST 1 VIEW: CPT

## 2025-04-02 PROCEDURE — 700102 HCHG RX REV CODE 250 W/ 637 OVERRIDE(OP): Performed by: STUDENT IN AN ORGANIZED HEALTH CARE EDUCATION/TRAINING PROGRAM

## 2025-04-02 PROCEDURE — 99253 IP/OBS CNSLTJ NEW/EST LOW 45: CPT | Performed by: STUDENT IN AN ORGANIZED HEALTH CARE EDUCATION/TRAINING PROGRAM

## 2025-04-02 PROCEDURE — 86901 BLOOD TYPING SEROLOGIC RH(D): CPT

## 2025-04-02 PROCEDURE — 700105 HCHG RX REV CODE 258: Performed by: STUDENT IN AN ORGANIZED HEALTH CARE EDUCATION/TRAINING PROGRAM

## 2025-04-02 PROCEDURE — 83605 ASSAY OF LACTIC ACID: CPT

## 2025-04-02 PROCEDURE — 700111 HCHG RX REV CODE 636 W/ 250 OVERRIDE (IP): Performed by: STUDENT IN AN ORGANIZED HEALTH CARE EDUCATION/TRAINING PROGRAM

## 2025-04-02 PROCEDURE — 86850 RBC ANTIBODY SCREEN: CPT

## 2025-04-02 PROCEDURE — 87040 BLOOD CULTURE FOR BACTERIA: CPT

## 2025-04-02 PROCEDURE — 99223 1ST HOSP IP/OBS HIGH 75: CPT | Performed by: STUDENT IN AN ORGANIZED HEALTH CARE EDUCATION/TRAINING PROGRAM

## 2025-04-02 PROCEDURE — 770020 HCHG ROOM/CARE - TELE (206)

## 2025-04-02 RX ORDER — OXYCODONE HYDROCHLORIDE 5 MG/1
5 TABLET ORAL EVERY 4 HOURS PRN
Refills: 0 | Status: DISCONTINUED | OUTPATIENT
Start: 2025-04-02 | End: 2025-04-06

## 2025-04-02 RX ORDER — ACETAMINOPHEN 325 MG/1
650 TABLET ORAL EVERY 6 HOURS PRN
Status: DISCONTINUED | OUTPATIENT
Start: 2025-04-02 | End: 2025-04-08

## 2025-04-02 RX ORDER — ONDANSETRON 4 MG/1
4 TABLET, ORALLY DISINTEGRATING ORAL EVERY 4 HOURS PRN
Status: DISCONTINUED | OUTPATIENT
Start: 2025-04-02 | End: 2025-04-08 | Stop reason: HOSPADM

## 2025-04-02 RX ORDER — SODIUM CHLORIDE 9 MG/ML
INJECTION, SOLUTION INTRAVENOUS CONTINUOUS
Status: ACTIVE | OUTPATIENT
Start: 2025-04-02 | End: 2025-04-03

## 2025-04-02 RX ORDER — ONDANSETRON 2 MG/ML
4 INJECTION INTRAMUSCULAR; INTRAVENOUS EVERY 4 HOURS PRN
Status: DISCONTINUED | OUTPATIENT
Start: 2025-04-02 | End: 2025-04-08 | Stop reason: HOSPADM

## 2025-04-02 RX ORDER — PANTOPRAZOLE SODIUM 40 MG/10ML
40 INJECTION, POWDER, LYOPHILIZED, FOR SOLUTION INTRAVENOUS 2 TIMES DAILY
Status: DISCONTINUED | OUTPATIENT
Start: 2025-04-02 | End: 2025-04-07

## 2025-04-02 RX ADMIN — SODIUM CHLORIDE: 9 INJECTION, SOLUTION INTRAVENOUS at 21:42

## 2025-04-02 RX ADMIN — PANTOPRAZOLE SODIUM 40 MG: 40 INJECTION, POWDER, FOR SOLUTION INTRAVENOUS at 21:47

## 2025-04-02 RX ADMIN — PIPERACILLIN AND TAZOBACTAM 4.5 G: 4; .5 INJECTION, POWDER, FOR SOLUTION INTRAVENOUS at 21:46

## 2025-04-02 RX ADMIN — OXYCODONE HYDROCHLORIDE 5 MG: 5 TABLET ORAL at 20:39

## 2025-04-02 SDOH — ECONOMIC STABILITY: TRANSPORTATION INSECURITY
IN THE PAST 12 MONTHS, HAS LACK OF RELIABLE TRANSPORTATION KEPT YOU FROM MEDICAL APPOINTMENTS, MEETINGS, WORK OR FROM GETTING THINGS NEEDED FOR DAILY LIVING?: NO

## 2025-04-02 SDOH — ECONOMIC STABILITY: TRANSPORTATION INSECURITY
IN THE PAST 12 MONTHS, HAS THE LACK OF TRANSPORTATION KEPT YOU FROM MEDICAL APPOINTMENTS OR FROM GETTING MEDICATIONS?: NO

## 2025-04-02 ASSESSMENT — ENCOUNTER SYMPTOMS
DIZZINESS: 0
NERVOUS/ANXIOUS: 0
VOMITING: 0
ABDOMINAL PAIN: 0
PHOTOPHOBIA: 0
HALLUCINATIONS: 0
SHORTNESS OF BREATH: 1
EYE PAIN: 0
INSOMNIA: 0
FALLS: 1
PALPITATIONS: 0
CHILLS: 0
BACK PAIN: 1
FEVER: 0
DOUBLE VISION: 0
NECK PAIN: 1
CONSTIPATION: 0
BLOOD IN STOOL: 1
NAUSEA: 0
EYE DISCHARGE: 0
DIARRHEA: 0
SINUS PAIN: 0
ORTHOPNEA: 0
HEADACHES: 0

## 2025-04-02 ASSESSMENT — COGNITIVE AND FUNCTIONAL STATUS - GENERAL
MOBILITY SCORE: 24
SUGGESTED CMS G CODE MODIFIER DAILY ACTIVITY: CH
DAILY ACTIVITIY SCORE: 24
SUGGESTED CMS G CODE MODIFIER MOBILITY: CH

## 2025-04-02 ASSESSMENT — LIFESTYLE VARIABLES
ALCOHOL_USE: YES
TOTAL SCORE: 2
TOTAL SCORE: 2
AVERAGE NUMBER OF DAYS PER WEEK YOU HAVE A DRINK CONTAINING ALCOHOL: 4
SUBSTANCE_ABUSE: 0
EVER FELT BAD OR GUILTY ABOUT YOUR DRINKING: YES
TOTAL SCORE: 2
ON A TYPICAL DAY WHEN YOU DRINK ALCOHOL HOW MANY DRINKS DO YOU HAVE: 1
HOW MANY TIMES IN THE PAST YEAR HAVE YOU HAD 5 OR MORE DRINKS IN A DAY: 4
DOES PATIENT WANT TO STOP DRINKING: NO
CONSUMPTION TOTAL: POSITIVE
HAVE PEOPLE ANNOYED YOU BY CRITICIZING YOUR DRINKING: NO
EVER HAD A DRINK FIRST THING IN THE MORNING TO STEADY YOUR NERVES TO GET RID OF A HANGOVER: NO
HAVE YOU EVER FELT YOU SHOULD CUT DOWN ON YOUR DRINKING: YES

## 2025-04-02 ASSESSMENT — SOCIAL DETERMINANTS OF HEALTH (SDOH)
WITHIN THE PAST 12 MONTHS, THE FOOD YOU BOUGHT JUST DIDN'T LAST AND YOU DIDN'T HAVE MONEY TO GET MORE: NEVER TRUE
WITHIN THE LAST YEAR, HAVE YOU BEEN AFRAID OF YOUR PARTNER OR EX-PARTNER?: NO
WITHIN THE LAST YEAR, HAVE TO BEEN RAPED OR FORCED TO HAVE ANY KIND OF SEXUAL ACTIVITY BY YOUR PARTNER OR EX-PARTNER?: NO
WITHIN THE LAST YEAR, HAVE YOU BEEN HUMILIATED OR EMOTIONALLY ABUSED IN OTHER WAYS BY YOUR PARTNER OR EX-PARTNER?: NO
WITHIN THE LAST YEAR, HAVE YOU BEEN KICKED, HIT, SLAPPED, OR OTHERWISE PHYSICALLY HURT BY YOUR PARTNER OR EX-PARTNER?: NO
IN THE PAST 12 MONTHS, HAS THE ELECTRIC, GAS, OIL, OR WATER COMPANY THREATENED TO SHUT OFF SERVICE IN YOUR HOME?: NO
WITHIN THE PAST 12 MONTHS, YOU WORRIED THAT YOUR FOOD WOULD RUN OUT BEFORE YOU GOT THE MONEY TO BUY MORE: NEVER TRUE

## 2025-04-02 ASSESSMENT — PAIN DESCRIPTION - PAIN TYPE
TYPE: ACUTE PAIN
TYPE: ACUTE PAIN

## 2025-04-02 ASSESSMENT — PATIENT HEALTH QUESTIONNAIRE - PHQ9
2. FEELING DOWN, DEPRESSED, IRRITABLE, OR HOPELESS: NOT AT ALL
1. LITTLE INTEREST OR PLEASURE IN DOING THINGS: NOT AT ALL
SUM OF ALL RESPONSES TO PHQ9 QUESTIONS 1 AND 2: 0

## 2025-04-02 NOTE — Clinical Note
Closure device placed in the right femoral artery. 6F AngioSeal VIP applied to Right Femoral Groin Access Site   REF: [id number]  LOT: [id number]  EXP: 2025-10-30

## 2025-04-02 NOTE — PROGRESS NOTES
Triage Officer Note    Pt being sent to us for anticipated IR embolization from [facility]    PMHx ETOH abuse, multiple GLFs, recent rib and sternal Fx's for which he was taking ibuprofen    Presented with melena and found on CT to have contained duodenal perforation which would be a contraindication to EGD.    Evaluated by Surgery who state that as it is contained does not at present require surgery  Both recommend IR embolization which has been DW Dr Collazo here who has agreed to evaluate on presentation  On IV PPI  Has gotten a total of 4 units PRBCs  Not on any anticoagulants  No evidence of ascites, varices or portal hypertension so no Octreotide

## 2025-04-03 ENCOUNTER — PATIENT OUTREACH (OUTPATIENT)
Dept: SCHEDULING | Facility: IMAGING CENTER | Age: 70
End: 2025-04-03

## 2025-04-03 LAB
ALBUMIN SERPL BCP-MCNC: 2.9 G/DL (ref 3.2–4.9)
ALBUMIN/GLOB SERPL: 1.5 G/DL
ALP SERPL-CCNC: 70 U/L (ref 30–99)
ALT SERPL-CCNC: 8 U/L (ref 2–50)
ANION GAP SERPL CALC-SCNC: 10 MMOL/L (ref 7–16)
AST SERPL-CCNC: 20 U/L (ref 12–45)
BILIRUB SERPL-MCNC: 0.8 MG/DL (ref 0.1–1.5)
BUN SERPL-MCNC: 16 MG/DL (ref 8–22)
CALCIUM ALBUM COR SERPL-MCNC: 8.6 MG/DL (ref 8.5–10.5)
CALCIUM SERPL-MCNC: 7.7 MG/DL (ref 8.5–10.5)
CFT BLD TEG: 3.6 MIN (ref 4.6–9.1)
CFT P HPASE BLD TEG: 4.2 MIN (ref 4.3–8.3)
CHLORIDE SERPL-SCNC: 105 MMOL/L (ref 96–112)
CLOT ANGLE BLD TEG: 73.6 DEGREES (ref 63–78)
CO2 SERPL-SCNC: 18 MMOL/L (ref 20–33)
CREAT SERPL-MCNC: 0.71 MG/DL (ref 0.5–1.4)
CT.EXTRINSIC BLD ROTEM: 1.3 MIN (ref 0.8–2.1)
ERYTHROCYTE [DISTWIDTH] IN BLOOD BY AUTOMATED COUNT: 70.2 FL (ref 35.9–50)
GFR SERPLBLD CREATININE-BSD FMLA CKD-EPI: 99 ML/MIN/1.73 M 2
GLOBULIN SER CALC-MCNC: 1.9 G/DL (ref 1.9–3.5)
GLUCOSE SERPL-MCNC: 91 MG/DL (ref 65–99)
HCT VFR BLD AUTO: 25.7 % (ref 42–52)
HCT VFR BLD AUTO: 27 % (ref 42–52)
HCT VFR BLD AUTO: 27.1 % (ref 42–52)
HCT VFR BLD AUTO: 28.4 % (ref 42–52)
HGB BLD-MCNC: 8.7 G/DL (ref 14–18)
HGB BLD-MCNC: 9 G/DL (ref 14–18)
HGB BLD-MCNC: 9.1 G/DL (ref 14–18)
HGB BLD-MCNC: 9.3 G/DL (ref 14–18)
LACTATE SERPL-SCNC: 0.6 MMOL/L (ref 0.5–2)
LACTATE SERPL-SCNC: 0.7 MMOL/L (ref 0.5–2)
MCF BLD TEG: 53.9 MM (ref 52–69)
MCF.PLATELET INHIB BLD ROTEM: 16 MM (ref 15–32)
MCH RBC QN AUTO: 31.4 PG (ref 27–33)
MCHC RBC AUTO-ENTMCNC: 33.7 G/DL (ref 32.3–36.5)
MCV RBC AUTO: 93.1 FL (ref 81.4–97.8)
PA AA BLD-ACNC: 26.7 % (ref 0–11)
PA ADP BLD-ACNC: 57.1 % (ref 0–17)
PLATELET # BLD AUTO: 166 K/UL (ref 164–446)
PMV BLD AUTO: 8.8 FL (ref 9–12.9)
POTASSIUM SERPL-SCNC: 3.1 MMOL/L (ref 3.6–5.5)
PROT SERPL-MCNC: 4.8 G/DL (ref 6–8.2)
RBC # BLD AUTO: 2.9 M/UL (ref 4.7–6.1)
SODIUM SERPL-SCNC: 133 MMOL/L (ref 135–145)
TEG ALGORITHM TGALG: ABNORMAL
WBC # BLD AUTO: 8.9 K/UL (ref 4.8–10.8)

## 2025-04-03 PROCEDURE — 700102 HCHG RX REV CODE 250 W/ 637 OVERRIDE(OP): Performed by: STUDENT IN AN ORGANIZED HEALTH CARE EDUCATION/TRAINING PROGRAM

## 2025-04-03 PROCEDURE — 85347 COAGULATION TIME ACTIVATED: CPT

## 2025-04-03 PROCEDURE — 80053 COMPREHEN METABOLIC PANEL: CPT

## 2025-04-03 PROCEDURE — 97602 WOUND(S) CARE NON-SELECTIVE: CPT

## 2025-04-03 PROCEDURE — 700105 HCHG RX REV CODE 258: Performed by: STUDENT IN AN ORGANIZED HEALTH CARE EDUCATION/TRAINING PROGRAM

## 2025-04-03 PROCEDURE — 85027 COMPLETE CBC AUTOMATED: CPT

## 2025-04-03 PROCEDURE — A9270 NON-COVERED ITEM OR SERVICE: HCPCS | Performed by: STUDENT IN AN ORGANIZED HEALTH CARE EDUCATION/TRAINING PROGRAM

## 2025-04-03 PROCEDURE — 83605 ASSAY OF LACTIC ACID: CPT

## 2025-04-03 PROCEDURE — 85014 HEMATOCRIT: CPT

## 2025-04-03 PROCEDURE — 700111 HCHG RX REV CODE 636 W/ 250 OVERRIDE (IP): Mod: JZ | Performed by: SURGERY

## 2025-04-03 PROCEDURE — 700111 HCHG RX REV CODE 636 W/ 250 OVERRIDE (IP): Mod: JZ | Performed by: STUDENT IN AN ORGANIZED HEALTH CARE EDUCATION/TRAINING PROGRAM

## 2025-04-03 PROCEDURE — 85018 HEMOGLOBIN: CPT | Mod: 91

## 2025-04-03 PROCEDURE — 85384 FIBRINOGEN ACTIVITY: CPT

## 2025-04-03 PROCEDURE — 99231 SBSQ HOSP IP/OBS SF/LOW 25: CPT | Performed by: SURGERY

## 2025-04-03 PROCEDURE — 85576 BLOOD PLATELET AGGREGATION: CPT | Mod: 91

## 2025-04-03 PROCEDURE — 770020 HCHG ROOM/CARE - TELE (206)

## 2025-04-03 PROCEDURE — 99233 SBSQ HOSP IP/OBS HIGH 50: CPT | Performed by: STUDENT IN AN ORGANIZED HEALTH CARE EDUCATION/TRAINING PROGRAM

## 2025-04-03 PROCEDURE — 36415 COLL VENOUS BLD VENIPUNCTURE: CPT

## 2025-04-03 RX ORDER — IBUPROFEN 200 MG
600 TABLET ORAL 2 TIMES DAILY PRN
Status: ON HOLD | COMMUNITY
End: 2025-04-08

## 2025-04-03 RX ORDER — CALCIUM GLUCONATE 20 MG/ML
2 INJECTION, SOLUTION INTRAVENOUS ONCE
Status: COMPLETED | OUTPATIENT
Start: 2025-04-03 | End: 2025-04-03

## 2025-04-03 RX ORDER — POTASSIUM CHLORIDE 7.45 MG/ML
10 INJECTION INTRAVENOUS
Status: COMPLETED | OUTPATIENT
Start: 2025-04-03 | End: 2025-04-03

## 2025-04-03 RX ADMIN — PIPERACILLIN AND TAZOBACTAM 4.5 G: 4; .5 INJECTION, POWDER, FOR SOLUTION INTRAVENOUS at 13:54

## 2025-04-03 RX ADMIN — PIPERACILLIN AND TAZOBACTAM 4.5 G: 4; .5 INJECTION, POWDER, FOR SOLUTION INTRAVENOUS at 00:26

## 2025-04-03 RX ADMIN — OXYCODONE HYDROCHLORIDE 5 MG: 5 TABLET ORAL at 17:46

## 2025-04-03 RX ADMIN — OXYCODONE HYDROCHLORIDE 5 MG: 5 TABLET ORAL at 05:21

## 2025-04-03 RX ADMIN — POTASSIUM CHLORIDE 10 MEQ: 7.46 INJECTION, SOLUTION INTRAVENOUS at 09:09

## 2025-04-03 RX ADMIN — OXYCODONE HYDROCHLORIDE 5 MG: 5 TABLET ORAL at 13:38

## 2025-04-03 RX ADMIN — POTASSIUM CHLORIDE 10 MEQ: 7.46 INJECTION, SOLUTION INTRAVENOUS at 12:45

## 2025-04-03 RX ADMIN — PIPERACILLIN AND TAZOBACTAM 4.5 G: 4; .5 INJECTION, POWDER, FOR SOLUTION INTRAVENOUS at 05:19

## 2025-04-03 RX ADMIN — PANTOPRAZOLE SODIUM 40 MG: 40 INJECTION, POWDER, FOR SOLUTION INTRAVENOUS at 05:20

## 2025-04-03 RX ADMIN — PANTOPRAZOLE SODIUM 40 MG: 40 INJECTION, POWDER, FOR SOLUTION INTRAVENOUS at 17:14

## 2025-04-03 RX ADMIN — OXYCODONE HYDROCHLORIDE 5 MG: 5 TABLET ORAL at 01:02

## 2025-04-03 RX ADMIN — OXYCODONE HYDROCHLORIDE 5 MG: 5 TABLET ORAL at 09:24

## 2025-04-03 RX ADMIN — OXYCODONE HYDROCHLORIDE 5 MG: 5 TABLET ORAL at 22:02

## 2025-04-03 RX ADMIN — CALCIUM GLUCONATE 2 G: 20 INJECTION, SOLUTION INTRAVENOUS at 09:13

## 2025-04-03 RX ADMIN — PIPERACILLIN AND TAZOBACTAM 4.5 G: 4; .5 INJECTION, POWDER, FOR SOLUTION INTRAVENOUS at 21:00

## 2025-04-03 RX ADMIN — POTASSIUM CHLORIDE 10 MEQ: 7.46 INJECTION, SOLUTION INTRAVENOUS at 11:29

## 2025-04-03 RX ADMIN — POTASSIUM CHLORIDE 10 MEQ: 7.46 INJECTION, SOLUTION INTRAVENOUS at 10:24

## 2025-04-03 ASSESSMENT — PAIN DESCRIPTION - PAIN TYPE
TYPE: ACUTE PAIN
TYPE: ACUTE PAIN;CHRONIC PAIN

## 2025-04-03 ASSESSMENT — ENCOUNTER SYMPTOMS
DIZZINESS: 0
FEVER: 0
CHILLS: 0
WEAKNESS: 1
COUGH: 0
BLOOD IN STOOL: 1
ABDOMINAL PAIN: 1

## 2025-04-03 ASSESSMENT — FIBROSIS 4 INDEX: FIB4 SCORE: 2.94

## 2025-04-03 NOTE — THERAPY
Occupational Therapy Contact Note    Patient Name: Sam Potts  Age:  70 y.o., Sex:  male  Medical Record #: [id number]  Today's Date: 4/3/2025       04/03/25 0841   Treatment Variance   Reason For Missed Therapy Non-Medical - Other (Please Comment)   Interdisciplinary Plan of Care Collaboration   IDT Collaboration with  Other (See Comments);Nursing  (EMR)   Collaboration Comments OT consult rec'd. Per RN pt has been up and moving, six clicks of 24. No needs.   Session Information   Date / Session Number  4/3, No needs

## 2025-04-03 NOTE — CARE PLAN
The patient is Watcher - Medium risk of patient condition declining or worsening    Shift Goals  Clinical Goals: monitor for bleeding, pain control, patient will remain free from falls  Patient Goals: feel better    Progress made toward(s) clinical / shift goals:    Problem: Knowledge Deficit - Standard  Goal: Patient and family/care givers will demonstrate understanding of plan of care, disease process/condition, diagnostic tests and medications  Outcome: Progressing     Problem: Pain - Standard  Goal: Alleviation of pain or a reduction in pain to the patient’s comfort goal  Outcome: Progressing     Problem: Skin Integrity  Goal: Skin integrity is maintained or improved  Outcome: Progressing  Note: Umair assessment and interventions documented in flow sheets     Problem: Fall Risk  Goal: Patient will remain free from falls  Outcome: Progressing  Note: Laverne borden assessment and interventions documented in flow sheets       Patient is not progressing towards the following goals:

## 2025-04-03 NOTE — PROGRESS NOTES
4 Eyes Skin Assessment Completed by MENDY Finney and MENDY Colbert.    Head WDL  Ears Redness RIGHT ear  Nose WDL  Mouth WDL  Neck WDL  Breast/Chest WDL  Shoulder Blades WDL  Spine WDL  (R) Arm/Elbow/Hand Bruising  (L) Arm/Elbow/Hand Bruising  Abdomen WDL  Groin WDL  Scrotum/Coccyx/Buttocks Redness and Blanching  (R) Leg Scab  (L) Leg Scar and Scab  (R) Heel/Foot/Toe WDL  (L) Heel/Foot/Toe WDL          Devices In Places Tele Box, Blood Pressure Cuff, and Pulse Ox      Interventions In Place TAP System, Pillows, and Pressure Redistribution Mattress    Possible Skin Injury Yes    Pictures Uploaded Into Epic Yes  Wound Consult Placed Yes  RN Wound Prevention Protocol Ordered Yes

## 2025-04-03 NOTE — ASSESSMENT & PLAN NOTE
Patient has received total 4 units packed PRBC from outside facility  Most recent hemoglobin of 9.1  Transfuse for hemoglobin less than 7  CBC in am

## 2025-04-03 NOTE — PROGRESS NOTES
DATE: 4/3/2025    HD 1 UGIB    INTERVAL EVENTS:  Vitals stable.  Second hgb down just a bit  Some dizziness with standing  2 melanotic BM since arrival  Was taking 6-8 200mg ibuprofen a day and occasional migraine medication that contained aspirin leading up to this  No TEG/PM performed at admit  No acidosis  Reports feeling very hungry    PHYSICAL EXAMINATION:  Vital Signs: /76   Pulse 81   Temp 36.4 °C (97.5 °F) (Temporal)   Resp 18   Wt 66.9 kg (147 lb 7.8 oz)   SpO2 95%   GENERAL:  No acute distress  CHEST:  Lungs are clear to auscultation bilaterally  CARDIOVASCULAR:  Regular rate and rhythm  ABDOMEN: Soft, mildly tender mostly the upper quadrants, non-distended  EXTREMITIES:  Good range of motion through out  NEUROLOGIC:  Alert and oriented.  Cranial Nerves II through XII are grossly intact, no focal deficits appreciated  PSYCHIATRIC:  Normal affect and mood appropriate for age and condition  SKIN:  Warm and well perfused, no rashes    LABORATORY VALUES:  Recent Labs     04/02/25  0500 04/02/25  1414 04/02/25 2109 04/03/25  0027 04/03/25  0554   WBC 12.4*  --  8.9 8.9  --    RBC 2.20*  --  2.97* 2.90*  --    HEMOGLOBIN 6.9*   < > 9.3* 9.1* 8.7*   HEMATOCRIT 21.1*  --  27.3* 27.0* 25.7*   MCV 95.9*  --  91.9 93.1  --    MCH 31.4  --  31.3 31.4  --    MCHC 32.7*  --  34.1 33.7  --    RDW 22.3*  --  70.3* 70.2*  --    PLATELETCT 162  --  165 166  --    MPV 9.1  --  8.6* 8.8*  --     < > = values in this interval not displayed.     Recent Labs     04/02/25  0500 04/02/25 2109 04/03/25  0027   SODIUM 128* 132* 133*   POTASSIUM 3.4* 3.2* 3.1*   CHLORIDE 105 105 105   CO2 18* 19* 18*   GLUCOSE 120* 103* 91   BUN 30* 17 16   CREATININE 0.7 0.68 0.71   CALCIUM 7.5* 7.8* 7.7*     Recent Labs     04/01/25  1732 04/02/25  0500 04/02/25  2109 04/03/25  0027   ASTSGOT 32 26 22 20   ALTSGPT 25 11* 9 8   TBILIRUBIN 0.3 0.8 0.7 0.8   ALKPHOSPHAT 67 62 74 70   GLOBULIN  --   --  2.0 1.9   INR 1.16  --   --   --       Recent Labs     04/01/25  1732   APTT 23.9   INR 1.16       IMAGING:  DX-CHEST-LIMITED (1 VIEW)   Final Result         1.  Hazy right midlung opacity suggests subtle infiltrate   2.  Trace bilateral pleural effusions   3.  Left midlung pulmonary nodule, location and appearance suggest possible nipple shadow. Could be followed up with repeat chest x-ray with nipple markers to exclude pulmonary nodule.   4.  Atherosclerosis          ASSESSMENT AND PLAN:  1) UGIB from duodenal ulcer    Would not recommend a diet just yet.  Agree with IV antibiotics and high dose PPI.  TEG/PM ordered, if significant platelet inhibition exists (>70%), would transfuse platelets.  Hypokalemia and hypocalcemia addressed.  Continue serial hgb q6h.  Urgent upper endoscopy for worsening melena and/or hemodynamic compromise.  No indication for surgery at this time.     ____________________________________     Justus Whitney M.D.

## 2025-04-03 NOTE — ASSESSMENT & PLAN NOTE
Compression deformity is noted involving the T8 vertebrae, and is favored represent sequela of chronic rather than acute process.

## 2025-04-03 NOTE — ASSESSMENT & PLAN NOTE
Multiple rib fractures are present, variable age. Fractures involving posterior aspects of left ribs may be acute or subacute.  Multimodal pain control   CXR with no acute pathology

## 2025-04-03 NOTE — DISCHARGE PLANNING
Care Transition Team Assessment  Patient is a 70 year-old male admitted for IR consult. Please see pt's H&P for prior medical history. RNCM met with pt at bedside to complete assessment. Pt A&Ox4 and able to verify the information on the face sheet. Pt lives alone in a single-story, ground floor apt. Emergency contact is friend Gregorio Payne [phone]. Prior to admission patient is independent with ADL's and IADL’s. Patient denies DME use, reports he does not drive but gets rides from friends/bus/uber. Pt receives $1100 every 2 weeks from [facility]. The patient's PCP is none, call to  to estb patient with PCP with patient consent. Patient's preferred pharmacy is [facility] [place]. Patient denies a history of SNF/IPR nor HHC use in the past. Pt denies any SA or MH concerns, reports he quit drinking alcohol a few weeks ago. Patients confirmed medical coverage with Medicare and states he is in the process of applying for [employer]. Patient plans to utilize Uber to get home once medically stable for discharge. RNCM discussed discharge planning. Patient reported pt's goal is to return home once medically stable.      Information Source  Orientation Level: Oriented X4  Information Given By: Patient  Who is responsible for making decisions for patient? : Patient    Readmission Evaluation  Is this a readmission?: Yes - unplanned readmission  Why do you think you were readmitted?: IR evaluation    Elopement Risk  Legal Hold: No  Ambulatory or Self Mobile in Wheelchair: Yes  Disoriented: No  Psychiatric Symptoms: None  History of Wandering: No  Elopement this Admit: No  Vocalizing Wanting to Leave: No  Displays Behaviors, Body Language Wanting to Leave: No-Not at Risk for Elopement    Interdisciplinary Discharge Planning  Lives with - Patient's Self Care Capacity: Alone and Able to Care For Self  Patient or legal guardian wants to designate a caregiver: No  Support Systems: Friends / Neighbors  Housing /  Facility: 1 Story Apartment / Condo    Discharge Preparedness  What is your plan after discharge?: Home with help  What are your discharge supports?: Other (comment) (friends)  Prior Functional Level: Ambulatory, Needs Assist with Activities of Daily Living, Independent with Medication Management  Difficulity with ADLs: None  Difficulity with IADLs: Driving  Difficulity with IADL Comments: friends/uber/bus    Functional Assesment  Prior Functional Level: Ambulatory, Needs Assist with Activities of Daily Living, Independent with Medication Management    Finances  Financial Barriers to Discharge: No  Prescription Coverage: Yes    Vision / Hearing Impairment  Vision Impairment : Yes  Right Eye Vision: Impaired, Wears Glasses  Left Eye Vision: Impaired, Wears Glasses  Hearing Impairment : No    Advance Directive  Advance Directive?: None  Advance Directive offered?: AD Booklet given    Domestic Abuse  Possible Abuse/Neglect Reported to:: Not Applicable    Psychological Assessment  History of Substance Abuse: None (reports quit drinking alcohol a few weeks ago)  History of Psychiatric Problems: No  Non-compliant with Treatment: No  Newly Diagnosed Illness: No    Discharge Risks or Barriers  Discharge risks or barriers?: No PCP, Transportation, Complex medical needs  Patient risk factors: Lives alone and no community support, Readmission    Anticipated Discharge Information  Discharge Disposition: Discharged to home/self care (01)  Discharge Address: [address] [place] [address]  Discharge Contact Phone Number: [phone]  Case Management Discharge Planning    Admission Date: 4/2/2025  GMLOS: 2.9  ALOS: 1    6-Clicks ADL Score: 24  6-Clicks Mobility Score: 24    Anticipated Discharge Dispo: Discharge Disposition: Discharged to home/self care (01)  Discharge Address: [address] [place], [place] [id number]  Discharge Contact Phone Number: [phone]    DME Needed: No    Action(s)  Taken: DC Assessment Complete (See below)  Call to  to estb patient with PCP, with patient consent. Will show in AVS    Escalations Completed: None    Medically Clear: No    Next Steps: RNCM to continue to follow patient for DCP needs.     Barriers to Discharge: Medical clearance

## 2025-04-03 NOTE — WOUND TEAM
[facility]  Inpatient Services  Wound and Skin Care Brief Evaluation    Admission Date: 4/2/2025     Last order of IP CONSULT TO WOUND CARE was found on 4/2/2025 from Hospital Encounter on 4/2/2025     HPI, PMH, SH: Reviewed    No chief complaint on file.    Diagnosis: UGIB (upper gastrointestinal bleed) [K92.2]    Unit where seen by Wound Team: T837/00     Wound consult placed regarding Right ear. Chart and images reviewed. This discussed with bedside RN Keyanna. This clinician in to assess patient. Patient pleasant and agreeable. Non-selectively debrided with N/A.     No pressure injuries or advanced wound care needs identified. Wound consult completed. No further follow up unless indicated and consulted.     Assessed - Right Ear - intact and blanching. Gray foam for offloading on glasses. Education provided to patient for how proper adjustment of glasses can be done to prevent pressure injuries. Patient verbalized understanding.           PREVENTATIVE INTERVENTIONS:    Q shift Umair - performed per nursing policy  Q shift pressure point assessments - performed per nursing policy    Surface/Positioning  Standard/trauma mattress - Currently in Place    Respiratory  Gray Foam Ear protectors - placed on glasses    Mobilization      Turns self without assistance

## 2025-04-03 NOTE — DISCHARGE PLANNING
Case Management Discharge Planning    Admission Date: 4/2/2025  GMLOS: 2.9  ALOS: 1    TRANSFER BACK PATIENT    Referring Facility: [facility]   Reason for Transfer: IR    Signed Repatriation/Transfer Back Agreement saved in .    Once this patient has received the requested service or the patient no longer requires tertiary level of care from [facility] and is stable to transfer back to referring facility, we can facilitate a transfer back. Please contact the [facility] at [phone] to coordinate this transfer request.

## 2025-04-03 NOTE — ASSESSMENT & PLAN NOTE
Reports using ibuprofen for low back pain  Also reports drinking alcohol and quit drinking couple weeks ago  Patient reports a 1-1 and half week history of melanotic and bright red bloody bowel movements  CT imaging was obtained concerning for perforation of the descending outside of the duodenum.  Case discussed with both general surgery and gastroenterology.    EGD is contraindicated at this time  Patient is now status post 5units of packed PRBC  Trend hgb   PPI BID  Dr. Osei IR consulted and patient is now status post. satisfactory coil embo of GDA. NO ACTIVE BLEED PRESENT AT THIS TIME on 4/5  Now on regular diet   Avoid NSAIDs  Transfuse for hemoglobin < 7

## 2025-04-03 NOTE — H&P
Hospital Medicine History & Physical Note    Date of Service  4/2/2025    Primary Care Physician  Pcp Pt States None    Consultants  IR    Specialist Names: Zay    Code Status  Full Code    Chief Complaint  No chief complaint on file.      History of Presenting Illness  Sam Potts is a 70 y.o. male who presented 4/2/2025 as a transfer positive facility for higher level of care and interventional radiology evaluation.  Patient was being evaluated at outside facility after having ongoing black tarry schools and bright red bloody bowel movements over the last week to week and a half.  Patient does endorse some generalized abdominal pain as well.  CT of the abdomen was performed and was concerning for perforation of the descending aspect of the duodenum.  Both general surgery and gastroenterology service consulted on the patient.  EGD is contraindicated at this time.  General surgery did not feel like a Darrel patch was appropriate, and recommended that the patient be seen by interventional radiology for embolization.  Our interventional radiologist was consulted prior to arrival and was agreeable for an embolization.  Of note, the patient was found to be severely anemic.  He received a total of 4 units packed PRBC.  Hemoglobin currently stable at 9.1.    I discussed the plan of care with patient and bedside RN.    Review of Systems  Review of Systems   Constitutional:  Negative for chills and fever.   HENT:  Negative for congestion, ear discharge, ear pain, nosebleeds and sinus pain.    Eyes:  Negative for double vision, photophobia, pain and discharge.   Respiratory:  Positive for shortness of breath.    Cardiovascular:  Negative for chest pain, palpitations and orthopnea.   Gastrointestinal:  Positive for blood in stool and melena. Negative for abdominal pain, constipation, diarrhea, nausea and vomiting.   Genitourinary:  Negative for dysuria, frequency, hematuria and urgency.   Musculoskeletal:  Positive for  back pain, falls, joint pain and neck pain.   Neurological:  Negative for dizziness and headaches.   Psychiatric/Behavioral:  Negative for hallucinations, substance abuse and suicidal ideas. The patient is not nervous/anxious and does not have insomnia.    All other systems reviewed and are negative.      Past Medical History   has a past medical history of Acid reflux, Benign essential HTN (10/30/2022), Blood transfusion without reported diagnosis, Chronic pain, Head ache, and Prostate enlargement.    Surgical History   has a past surgical history that includes other orthopedic surgery; shoulder surgery; colon resection; endoscopy procedure (3/5/2024); and pr colonoscopy-flexible (3/6/2024).     Family History  family history includes Alcohol/Drug in his brother; Asthma in his brother; Heart Attack in his father; Other in his brother.   Family history reviewed with patient. There is no family history that is pertinent to the chief complaint.     Social History   reports that he has been smoking cigarettes. He started smoking about 6 years ago. He has a 3.1 pack-year smoking history. He has never been exposed to tobacco smoke. He has never used smokeless tobacco. He reports current alcohol use of about 33.6 oz of alcohol per week. He reports that he does not use drugs.    Allergies  Allergies   Allergen Reactions    Apple Cider Vinegar Rash     Pt states he is allergic to all vinegar, pickles    Morphine Nausea       Medications  Prior to Admission Medications   Prescriptions Last Dose Informant Patient Reported? Taking?   acetaminophen (TYLENOL) 325 MG Tab   No No   Sig: Take 2 Tablets by mouth every four hours as needed for Mild Pain or Moderate Pain.   escitalopram (LEXAPRO) 10 MG Tab   No No   Sig: Take 1 Tablet by mouth every day for 30 days.   gabapentin (NEURONTIN) 300 MG Cap   No No   Sig: Take 1 pill 3x daily for 2 days then 1 pill 2x daily for 3 days then 1 pill nightly until gone   ibuprofen (MOTRIN) 600  MG Tab   No No   Sig: Take 1 Tablet by mouth every 6 hours as needed for Moderate Pain for up to 30 days.   naltrexone (DEPADE) 50 MG Tab   No No   Sig: Take 1 Tablet by mouth every day.      Facility-Administered Medications: None       Physical Exam  Temp:  [36.6 °C (97.9 °F)-37.3 °C (99.1 °F)] 36.6 °C (97.9 °F)  Pulse:  [] 74  Resp:  [16-26] 17  BP: ()/(60-81) 148/81  SpO2:  [92 %-100 %] 98 %  Blood Pressure : (!) 148/81   Temperature: 36.6 °C (97.9 °F)   Pulse: 74   Respiration: 17   Pulse Oximetry: 98 %       Physical Exam  Constitutional:       General: He is not in acute distress.     Appearance: Normal appearance. He is normal weight. He is not ill-appearing, toxic-appearing or diaphoretic.   HENT:      Head: Normocephalic and atraumatic.      Mouth/Throat:      Mouth: Mucous membranes are moist.   Eyes:      Pupils: Pupils are equal, round, and reactive to light.   Cardiovascular:      Rate and Rhythm: Normal rate and regular rhythm.      Pulses: Normal pulses.      Heart sounds: Normal heart sounds. No murmur heard.     No friction rub. No gallop.   Pulmonary:      Effort: Pulmonary effort is normal. No respiratory distress.      Breath sounds: Normal breath sounds. No stridor. No wheezing, rhonchi or rales.   Chest:      Chest wall: No tenderness.   Abdominal:      General: There is no distension.      Palpations: There is no mass.      Tenderness: There is abdominal tenderness. There is no right CVA tenderness, left CVA tenderness, guarding or rebound.      Hernia: No hernia is present.   Musculoskeletal:         General: No swelling, tenderness, deformity or signs of injury.      Right lower leg: No edema.      Left lower leg: No edema.   Skin:     General: Skin is warm and dry.      Capillary Refill: Capillary refill takes less than 2 seconds.      Coloration: Skin is not jaundiced or pale.      Findings: No bruising, erythema, lesion or rash.   Neurological:      General: No focal deficit  "present.      Mental Status: He is alert and oriented to person, place, and time. Mental status is at baseline.      Cranial Nerves: No cranial nerve deficit.      Sensory: No sensory deficit.      Motor: No weakness.      Coordination: Coordination normal.      Gait: Gait normal.      Deep Tendon Reflexes: Reflexes normal.   Psychiatric:         Mood and Affect: Mood normal.         Laboratory:  Recent Labs     04/01/25  1732 04/01/25  2300 04/02/25  0500 04/02/25  1414   WBC 17.7*  --  12.4*  --    RBC 1.67*  --  2.20*  --    HEMOGLOBIN 5.6* 7.5* 6.9* 9.1*   HEMATOCRIT 18.2*  --  21.1*  --    .0*  --  95.9*  --    MCH 33.5*  --  31.4  --    MCHC 30.8*  --  32.7*  --    RDW 18.5*  --  22.3*  --    PLATELETCT 258  --  162  --    MPV 9.2  --  9.1  --      Recent Labs     04/01/25  1732 04/02/25  0500   SODIUM 129* 128*   POTASSIUM 3.8 3.4*   CHLORIDE 102 105   CO2 10* 18*   GLUCOSE 146* 120*   BUN 44* 30*   CREATININE 1.1 0.7   CALCIUM 7.7* 7.5*     Recent Labs     04/01/25  1732 04/02/25  0500   ALTSGPT 25 11*   ASTSGOT 32 26   ALKPHOSPHAT 67 62   TBILIRUBIN 0.3 0.8   GLUCOSE 146* 120*     Recent Labs     04/01/25  1732   APTT 23.9   INR 1.16     Recent Labs     04/01/25  1732   NTPROBNP 270         No results for input(s): \"TROPONINT\" in the last 72 hours.    Imaging:  DX-CHEST-LIMITED (1 VIEW)    (Results Pending)       X-Ray:  I have personally reviewed the images and compared with prior images.  EKG:  I have personally reviewed the images and compared with prior images.    Assessment/Plan:  Justification for Admission Status  I anticipate this patient will require at least two midnights for appropriate medical management, necessitating inpatient admission because GI bleed    Patient will need a Telemetry bed on MEDICAL service .  The need is secondary to GI bleed.    * UGIB (upper gastrointestinal bleed)- (present on admission)  Assessment & Plan  Patient reports a 1-1 and half week history of melanotic " and bright red bloody bowel movements  CT imaging was obtained concerning for perforation of the descending outside of the duodenum.  Case discussed with both general surgery and gastroenterology.  EGD is contraindicated at this time  Patient is now status post 4 units of packed PRBC  Hemoglobin stable at 9.1  Trend hemoglobin every 6 hours  Continue with Toradol 40 mg IV twice daily  I have paged out Dr. Collazo, who was consulted on this patient prior to transfer for IR embolization  Keep NPO  Avoid NSAIDs  Transfuse for hemoglobin < 7     Duodenal perforation (HCC)  Assessment & Plan  CT imaging:  Findings are concerning for perforation of the descending aspect of the duodenum, and correlation with endoscopy is suggested.   General surgery consulted.  No surgical intervention at this time.  They are not recommending a Darrel patch.  Rather they are recommending IR intervention with embolization  I have reached out to IR team overnight  Continue with IV Zosyn      Acute blood loss anemia  Assessment & Plan  Patient has received total 4 units packed PRBC from outside facility  Most recent hemoglobin of 9.1  Follow-up repeat CBC  Every 6 hour H&H  Transfuse for hemoglobin less than 7      Falls- (present on admission)  Assessment & Plan  PT/OT evaluation     History of vertebral fracture- (present on admission)  Assessment & Plan  Compression deformity is noted involving the T8 vertebrae, and is favored represent sequela of chronic rather than acute process.     Rib fractures- (present on admission)  Assessment & Plan  Multiple rib fractures are present, variable age. Fractures involving posterior aspects of left ribs may be acute or subacute.  Multimodal pain control         VTE prophylaxis: SCDs/TEDs

## 2025-04-03 NOTE — PROGRESS NOTES
Monitor Summary  Rhythm: SR  Rate:73-87  Ectopy: R-PAC/R-PVC/O-PAC  Measurement: 0.15/0.09/0.38

## 2025-04-03 NOTE — THERAPY
Physical Therapy Contact Note    PT consult received and acknowledged. Discussed role of acute PT with patient; he reported no concerns regarding mobility or returning home following medical DC. Note 6 clicks mobility score of 24/24. No acute PT indicated, completing order.     Juli Phillips, PT, DPT  [phone]

## 2025-04-03 NOTE — PROGRESS NOTES
Hospital Medicine Daily Progress Note    Date of Service  4/3/2025    Chief Complaint  Sam Potts is a 70 y.o. male admitted 4/2/2025 with UGIB and perforation     Hospital Course  No notes on file    Sam Potts is a 70 y.o. male who presented 4/2/2025 as a transfer positive facility for higher level of care and interventional radiology evaluation.      Patient was being evaluated at outside facility after having ongoing black tarry schools and bright red bloody bowel movements over the last week to week and a half.  Patient does endorse some generalized abdominal pain as well.      CT of the abdomen was performed and was concerning for perforation of the descending aspect of the duodenum.  Both general surgery and gastroenterology service consulted on the patient.  EGD is contraindicated at this time.  General surgery did not feel like a Darrel patch was appropriate, and recommended that the patient be seen by interventional radiology for embolization.  IR evaluated patient and recommended no plans for intervention.   Of note, the patient was found to be severely anemic.  He received a total of 4 units packed PRBC.  Hemoglobin currently stable at 9.1.       Interval Problem Update  4/3:  Seen and evaluated patient at the bedside. Patient reports using ibuprophen for low back pain. Also reports drinking alcohol. Quit couple weeks ago. Reports 1 small melanotic stool this morning. Denies chest pain. Denies nausea or vomiting. On RA. /83, pulse 74.   Hgb 9.3  CMP showed Na of 133, K 3.1, Co2 18, calcium 7.7  Received IV calcium gluconate   On IV potassium 10 meq   On IV PPI  On IV zosyn  Blood culture with no growth to date  IR evaluated patient and recommended conservative management   Trend hgb   Repeat CBC in am to monitor hgb level   Repeat CMP in am to monitor renal function and electrolyte imbalance.            I have discussed this patient's plan of care and discharge plan at IDT rounds today with  Case Management, Nursing, Nursing leadership, and other members of the IDT team.    Consultants/Specialty  General surgery   GI   IR     Code Status  Full Code    Disposition  Not Medically Cleared  I have placed the appropriate orders for post-discharge needs.    Review of Systems  Review of Systems   Constitutional:  Positive for malaise/fatigue. Negative for chills and fever.   Respiratory:  Negative for cough.    Cardiovascular:  Negative for chest pain and leg swelling.   Gastrointestinal:  Positive for abdominal pain and blood in stool.   Neurological:  Positive for weakness. Negative for dizziness.        Physical Exam  Temp:  [36.4 °C (97.5 °F)-37.2 °C (99 °F)] 36.4 °C (97.5 °F)  Pulse:  [74-81] 74  Resp:  [16-18] 18  BP: (118-148)/(72-83) 129/83  SpO2:  [92 %-98 %] 96 %    Physical Exam  Constitutional:       Appearance: Normal appearance.   HENT:      Head: Normocephalic.      Mouth/Throat:      Mouth: Mucous membranes are moist.   Cardiovascular:      Rate and Rhythm: Normal rate.   Pulmonary:      Effort: Pulmonary effort is normal.   Abdominal:      General: Abdomen is flat.      Palpations: Abdomen is soft.      Tenderness: There is abdominal tenderness.      Comments: Epigastric tenderness   Musculoskeletal:         General: Normal range of motion.   Skin:     General: Skin is warm.   Neurological:      General: No focal deficit present.      Mental Status: He is alert.         Fluids    Intake/Output Summary (Last 24 hours) at 4/3/2025 1307  Last data filed at 4/3/2025 1245  Gross per 24 hour   Intake 0 ml   Output 580 ml   Net -580 ml        Laboratory  Recent Labs     04/02/25  0500 04/02/25  1414 04/02/25  2109 04/03/25  0027 04/03/25  0554 04/03/25  1139   WBC 12.4*  --  8.9 8.9  --   --    RBC 2.20*  --  2.97* 2.90*  --   --    HEMOGLOBIN 6.9*   < > 9.3* 9.1* 8.7* 9.3*   HEMATOCRIT 21.1*  --  27.3* 27.0* 25.7* 28.4*   MCV 95.9*  --  91.9 93.1  --   --    MCH 31.4  --  31.3 31.4  --   --    MCHC  32.7*  --  34.1 33.7  --   --    RDW 22.3*  --  70.3* 70.2*  --   --    PLATELETCT 162  --  165 166  --   --    MPV 9.1  --  8.6* 8.8*  --   --     < > = values in this interval not displayed.     Recent Labs     04/02/25  0500 04/02/25  2109 04/03/25  0027   SODIUM 128* 132* 133*   POTASSIUM 3.4* 3.2* 3.1*   CHLORIDE 105 105 105   CO2 18* 19* 18*   GLUCOSE 120* 103* 91   BUN 30* 17 16   CREATININE 0.7 0.68 0.71   CALCIUM 7.5* 7.8* 7.7*     Recent Labs     04/01/25  1732   APTT 23.9   INR 1.16               Imaging  DX-CHEST-LIMITED (1 VIEW)   Final Result         1.  Hazy right midlung opacity suggests subtle infiltrate   2.  Trace bilateral pleural effusions   3.  Left midlung pulmonary nodule, location and appearance suggest possible nipple shadow. Could be followed up with repeat chest x-ray with nipple markers to exclude pulmonary nodule.   4.  Atherosclerosis            Assessment/Plan  * UGIB (upper gastrointestinal bleed)- (present on admission)  Assessment & Plan  Reports using ibuprofen for low back pain  Also reports drinking alcohol and quit drinking couple weeks ago  Patient reports a 1-1 and half week history of melanotic and bright red bloody bowel movements  CT imaging was obtained concerning for perforation of the descending outside of the duodenum.  Case discussed with both general surgery and gastroenterology.    EGD is contraindicated at this time  Patient is now status post 4 units of packed PRBC  Trend hgb   PPI IV BID  IR recommended conservative management   NPO   Avoid NSAIDs  Transfuse for hemoglobin < 7     Duodenal perforation (HCC)  Assessment & Plan  CT imaging:  Findings are concerning for perforation of the descending aspect of the duodenum, and correlation with endoscopy is suggested.   General surgery consulted.  No surgical intervention at this time.  They are not recommending a Darrel patch.    IR recommended conservative management   Continue with IV Zosyn  Blood culture with no  growth to date       Acute blood loss anemia  Assessment & Plan  Patient has received total 4 units packed PRBC from outside facility  Most recent hemoglobin of 9.1  Follow-up repeat CBC  Every 6 hour H&H  Transfuse for hemoglobin less than 7      Falls- (present on admission)  Assessment & Plan  PT/OT evaluation     History of vertebral fracture- (present on admission)  Assessment & Plan  Compression deformity is noted involving the T8 vertebrae, and is favored represent sequela of chronic rather than acute process.     Rib fractures- (present on admission)  Assessment & Plan  Multiple rib fractures are present, variable age. Fractures involving posterior aspects of left ribs may be acute or subacute.  Multimodal pain control     Hypokalemia- (present on admission)  Assessment & Plan  Replete and monitor          VTE prophylaxis: contraindicated     I have performed a physical exam and reviewed and updated ROS and Plan today (4/3/2025). In review of yesterday's note (4/2/2025), there are no changes except as documented above.    Patient has a high medical complexity, complex decision making and is at high risk of complication, morbidity, and mortality

## 2025-04-03 NOTE — PROGRESS NOTES
Med rec is complete per Patient at bedside.   Per Pt, only taking Tylenol and OTC IBU.  Reconcile outside Information was available?Y  Allergies reviewed.    Has patient had any outside antibiotics in the last 30 days? N    Any Anticoagulants (rivaroxaban, apixaban, edoxaban, dabigatran, warfarin, enoxaparin) taken in the last 14 days? N       Pharmacy/Pharmacies Pt utilizes : [facility] for [facility] visit.

## 2025-04-03 NOTE — PROGRESS NOTES
GI team is informed regarding the patient's arrival.  GI team review the CT scan and also intake note from the medicine team.  At this time, for the perforated ulcer, no plan to have any endoscopy intervention at this time.  Will suggest continuing n.p.o. and other supportive care including PPI IV twice daily.  If brisk bleeding is noted, reconsult IR for possible embolization.

## 2025-04-03 NOTE — PROGRESS NOTES
Patient was transported to floor via EMS on [facility]. Patient was found A&O4 and was assisted from [facility] to hospital bed. Patient endorsed 10/10 back pain, MD at bedside. Medicated patient per MAR. Patient was connected to tele box and monitors were notified. Personal comfort items and call light within reach. Bed in lowest locked position.

## 2025-04-03 NOTE — CONSULTS
DATE OF CONSULTATION:  4/2/2025     REFERRING PHYSICIAN:   Marshall Leggett M.D.     CONSULTING PHYSICIAN:  Jaime Durand M.D.     REASON FOR CONSULTATION:  I have been asked by Dr. Leggett to see the patient in surgical consultation for evaluation of a duodenal ulcer.    HISTORY OF PRESENT ILLNESS: The patient is a 70 year-old White man who presents [facility] as a transfer from an OSH for a duodenal ulcer and upper GI bleed. He denies any significant medical comorbidity, denies asa/anticoagulation, and endorses prior orthopedic surgeries for a previous trauma.  Per the patient, he had a fall about 2 weeks prior to presentation which caused him to have significant amount of chest pain for the past 2 weeks.  He does endorse taking NSAIDs for pain. He then noticed some dark tarry colored stools for over the past week.  He decided to go to an outside hospital emergency department for the persistent pain.  CT imaging at the outside hospital was concerning for a possible duodenal perforation. There was however no free air or free fluid. He had an initial Hb of 5.6 and was transfused 4 units of prbc and then transferred to [facility] for higher level of care.    Currently the patient remains hemodynamically stable, his hb has responded appropriately to the transfusions. He has no associated leukocytosis. He predominately c/o left chest pain.    PAST MEDICAL HISTORY:  has a past medical history of Acid reflux, Benign essential HTN (10/30/2022), Blood transfusion without reported diagnosis, Chronic pain, Head ache, and Prostate enlargement.    PAST SURGICAL HISTORY:  has a past surgical history that includes other orthopedic surgery; shoulder surgery; colon resection; endoscopy procedure (3/5/2024); and pr colonoscopy-flexible (3/6/2024).    ALLERGIES:   Allergies   Allergen Reactions    Apple Cider Vinegar Rash     Pt states he is allergic to all vinegar, pickles    Morphine Nausea       CURRENT MEDICATIONS:    Home  Medications    **Home medications have not yet been reviewed for this encounter**       Audit from Redirected Encounters    **Home medications have not yet been reviewed for this encounter**         FAMILY HISTORY: family history includes Alcohol/Drug in his brother; Asthma in his brother; Heart Attack in his father; Other in his brother.    SOCIAL HISTORY:  reports that he has been smoking cigarettes. He started smoking about 6 years ago. He has a 3.1 pack-year smoking history. He has never been exposed to tobacco smoke. He has never used smokeless tobacco. He reports current alcohol use of about 33.6 oz of alcohol per week. He reports that he does not use drugs.    REVIEW OF SYSTEMS: Comprehensive review of systems is negative with the exception of the aforementioned HPI, PMH, and PSH bullets in accordance with CMS guidelines.    PHYSICAL EXAMINATION:    Physical Exam  Vitals and nursing note reviewed.   Constitutional:       Appearance: Normal appearance.   HENT:      Head: Normocephalic.      Nose: Nose normal.      Mouth/Throat:      Mouth: Mucous membranes are dry.      Pharynx: Oropharynx is clear.   Eyes:      Conjunctiva/sclera: Conjunctivae normal.   Cardiovascular:      Rate and Rhythm: Normal rate and regular rhythm.   Pulmonary:      Effort: Pulmonary effort is normal. No respiratory distress.   Abdominal:      General: Abdomen is flat. There is no distension.      Palpations: Abdomen is soft.      Comments: Mild ttp in the epigastric region   Skin:     General: Skin is warm and dry.   Neurological:      General: No focal deficit present.      Mental Status: He is alert and oriented to person, place, and time.   Psychiatric:         Mood and Affect: Mood normal.       LABORATORY VALUES:   Recent Labs     04/01/25  1732 04/01/25  2300 04/02/25  0500 04/02/25  1414 04/02/25  1655 04/02/25  2109   WBC 17.7*  --  12.4*  --   --  8.9   RBC 1.67*  --  2.20*  --   --  2.97*   HEMOGLOBIN 5.6*   < > 6.9* 9.1*  9.1* 9.3*   HEMATOCRIT 18.2*  --  21.1*  --   --  27.3*   .0*  --  95.9*  --   --  91.9   MCH 33.5*  --  31.4  --   --  31.3   MCHC 30.8*  --  32.7*  --   --  34.1   RDW 18.5*  --  22.3*  --   --  70.3*   PLATELETCT 258  --  162  --   --  165   MPV 9.2  --  9.1  --   --  8.6*    < > = values in this interval not displayed.     Recent Labs     04/01/25 1732 04/02/25  0500 04/02/25  2109   SODIUM 129* 128* 132*   POTASSIUM 3.8 3.4* 3.2*   CHLORIDE 102 105 105   CO2 10* 18* 19*   GLUCOSE 146* 120* 103*   BUN 44* 30* 17   CREATININE 1.1 0.7 0.68   CALCIUM 7.7* 7.5* 7.8*     Recent Labs     04/01/25 1732 04/02/25  0500 04/02/25  2109   ASTSGOT 32 26 22   ALTSGPT 25 11* 9   TBILIRUBIN 0.3 0.8 0.7   ALKPHOSPHAT 67 62 74   GLOBULIN  --   --  2.0   INR 1.16  --   --      Recent Labs     04/01/25 1732   APTT 23.9   INR 1.16        IMAGING:   DX-CHEST-LIMITED (1 VIEW)    (Results Pending)       ASSESSMENT AND PLAN:   Sam Potts is a 70 y.o. gentleman transferred to [facility] with a suspected duodenal perforation and GI bleed.  He is currently hemodynamically stable, no leukocytosis, and no abdominal pain.  CT does not show any free air or free fluid.  There may be a small contained posterior perforation causing bleeding.  However, it seems that he has responded to PPI and blood products.  - No acute surgical intervention. For bleeding ulcers I recommend GI evaluation first, then IR, and if those fail surgical intervention. Additionally if there is concern he has a free perforation into the abdomen with pneumoperitoneum please call surgery immediately.   - BID PPI  - NPI/mIVF  - Trend H/H  - Continue standard of care for suspected UGIB    DISPOSITION: Medical evaluation and admission. The patient was admitted to the Medical Service prior to surgical consultation. [facility] will follow.     ____________________________________     Jaime Durand M.D.    DD: 4/2/2025  10:36 PM    SAVANNA  Grading System for EGS Conditions  ACS NSQIP Surgical Risk Calculator

## 2025-04-03 NOTE — CARE PLAN
The patient is Stable - Low risk of patient condition declining or worsening    Shift Goals  Clinical Goals: electrolyte replacement, IV ABX, IV protonix, monitor H&H and for bleeding, pain management  Patient Goals: pain management  Family Goals: LIZZIE    Progress made toward(s) clinical / shift goals:      Problem: Knowledge Deficit - Standard  Goal: Patient and family/care givers will demonstrate understanding of plan of care, disease process/condition, diagnostic tests and medications  Outcome: Progressing  Note: Pt aware of POC: electrolyte replacement, IV ABX, IV protonix, monitor H&H and for bleeding, pain management. Goal is for pt to be updated in the moment.        Problem: Pain - Standard  Goal: Alleviation of pain or a reduction in pain to the patient’s comfort goal  Outcome: Progressing  Note: Oral PRN pain medication given for pain management, see MAR. Goal is for pt's pain to remain controlled with oral pain medication.         Patient is not progressing towards the following goals:

## 2025-04-03 NOTE — ASSESSMENT & PLAN NOTE
CT imaging:  Findings are concerning for perforation of the descending aspect of the duodenum, and correlation with endoscopy is suggested.   General surgery consulted.  No surgical intervention at this time.  They are not recommending a Darrel patch.    Dr. Osei IR consulted and patient is now status post. satisfactory coil embo of GDA. NO ACTIVE BLEED PRESENT AT THIS TIME on 4/5  Antibiotics switched from zosyn to IV unasyn. Blood culture with no growth to date and now on augmentin to finish course  Gen surgery advanced the diet to regular

## 2025-04-04 LAB
ALBUMIN SERPL BCP-MCNC: 2.7 G/DL (ref 3.2–4.9)
ALBUMIN/GLOB SERPL: 1.4 G/DL
ALP SERPL-CCNC: 74 U/L (ref 30–99)
ALT SERPL-CCNC: 9 U/L (ref 2–50)
ANION GAP SERPL CALC-SCNC: 10 MMOL/L (ref 7–16)
AST SERPL-CCNC: 24 U/L (ref 12–45)
BILIRUB SERPL-MCNC: 0.5 MG/DL (ref 0.1–1.5)
BUN SERPL-MCNC: 11 MG/DL (ref 8–22)
CALCIUM ALBUM COR SERPL-MCNC: 8.7 MG/DL (ref 8.5–10.5)
CALCIUM SERPL-MCNC: 7.7 MG/DL (ref 8.5–10.5)
CHLORIDE SERPL-SCNC: 104 MMOL/L (ref 96–112)
CO2 SERPL-SCNC: 17 MMOL/L (ref 20–33)
CREAT SERPL-MCNC: 0.68 MG/DL (ref 0.5–1.4)
ERYTHROCYTE [DISTWIDTH] IN BLOOD BY AUTOMATED COUNT: 72.8 FL (ref 35.9–50)
GFR SERPLBLD CREATININE-BSD FMLA CKD-EPI: 101 ML/MIN/1.73 M 2
GLOBULIN SER CALC-MCNC: 2 G/DL (ref 1.9–3.5)
GLUCOSE SERPL-MCNC: 86 MG/DL (ref 65–99)
HCT VFR BLD AUTO: 23.5 % (ref 42–52)
HCT VFR BLD AUTO: 23.7 % (ref 42–52)
HCT VFR BLD AUTO: 26.3 % (ref 42–52)
HCT VFR BLD AUTO: 27 % (ref 42–52)
HGB BLD-MCNC: 7.3 G/DL (ref 14–18)
HGB BLD-MCNC: 7.6 G/DL (ref 14–18)
HGB BLD-MCNC: 8.7 G/DL (ref 14–18)
HGB BLD-MCNC: 8.9 G/DL (ref 14–18)
MAGNESIUM SERPL-MCNC: 1.8 MG/DL (ref 1.5–2.5)
MCH RBC QN AUTO: 31.8 PG (ref 27–33)
MCHC RBC AUTO-ENTMCNC: 33.1 G/DL (ref 32.3–36.5)
MCV RBC AUTO: 96 FL (ref 81.4–97.8)
PLATELET # BLD AUTO: 215 K/UL (ref 164–446)
PMV BLD AUTO: 9 FL (ref 9–12.9)
POTASSIUM SERPL-SCNC: 3.5 MMOL/L (ref 3.6–5.5)
PROT SERPL-MCNC: 4.7 G/DL (ref 6–8.2)
RBC # BLD AUTO: 2.74 M/UL (ref 4.7–6.1)
SODIUM SERPL-SCNC: 131 MMOL/L (ref 135–145)
WBC # BLD AUTO: 9.1 K/UL (ref 4.8–10.8)

## 2025-04-04 PROCEDURE — 700111 HCHG RX REV CODE 636 W/ 250 OVERRIDE (IP)

## 2025-04-04 PROCEDURE — 85014 HEMATOCRIT: CPT

## 2025-04-04 PROCEDURE — 83735 ASSAY OF MAGNESIUM: CPT

## 2025-04-04 PROCEDURE — 700105 HCHG RX REV CODE 258: Performed by: STUDENT IN AN ORGANIZED HEALTH CARE EDUCATION/TRAINING PROGRAM

## 2025-04-04 PROCEDURE — 80053 COMPREHEN METABOLIC PANEL: CPT

## 2025-04-04 PROCEDURE — 700102 HCHG RX REV CODE 250 W/ 637 OVERRIDE(OP): Performed by: STUDENT IN AN ORGANIZED HEALTH CARE EDUCATION/TRAINING PROGRAM

## 2025-04-04 PROCEDURE — 99233 SBSQ HOSP IP/OBS HIGH 50: CPT | Performed by: STUDENT IN AN ORGANIZED HEALTH CARE EDUCATION/TRAINING PROGRAM

## 2025-04-04 PROCEDURE — 85018 HEMOGLOBIN: CPT

## 2025-04-04 PROCEDURE — 700111 HCHG RX REV CODE 636 W/ 250 OVERRIDE (IP): Mod: JZ | Performed by: STUDENT IN AN ORGANIZED HEALTH CARE EDUCATION/TRAINING PROGRAM

## 2025-04-04 PROCEDURE — 700111 HCHG RX REV CODE 636 W/ 250 OVERRIDE (IP): Performed by: STUDENT IN AN ORGANIZED HEALTH CARE EDUCATION/TRAINING PROGRAM

## 2025-04-04 PROCEDURE — 770020 HCHG ROOM/CARE - TELE (206)

## 2025-04-04 PROCEDURE — 36415 COLL VENOUS BLD VENIPUNCTURE: CPT

## 2025-04-04 PROCEDURE — A9270 NON-COVERED ITEM OR SERVICE: HCPCS | Performed by: STUDENT IN AN ORGANIZED HEALTH CARE EDUCATION/TRAINING PROGRAM

## 2025-04-04 PROCEDURE — 99232 SBSQ HOSP IP/OBS MODERATE 35: CPT | Performed by: STUDENT IN AN ORGANIZED HEALTH CARE EDUCATION/TRAINING PROGRAM

## 2025-04-04 PROCEDURE — 85027 COMPLETE CBC AUTOMATED: CPT

## 2025-04-04 RX ORDER — SODIUM CHLORIDE 9 MG/ML
250 INJECTION, SOLUTION INTRAVENOUS ONCE
Status: COMPLETED | OUTPATIENT
Start: 2025-04-04 | End: 2025-04-04

## 2025-04-04 RX ORDER — POTASSIUM CHLORIDE 7.45 MG/ML
10 INJECTION INTRAVENOUS
Status: DISPENSED | OUTPATIENT
Start: 2025-04-04 | End: 2025-04-04

## 2025-04-04 RX ORDER — SODIUM CHLORIDE, SODIUM LACTATE, POTASSIUM CHLORIDE, CALCIUM CHLORIDE 600; 310; 30; 20 MG/100ML; MG/100ML; MG/100ML; MG/100ML
INJECTION, SOLUTION INTRAVENOUS CONTINUOUS
Status: ACTIVE | OUTPATIENT
Start: 2025-04-04 | End: 2025-04-05

## 2025-04-04 RX ORDER — POTASSIUM CHLORIDE 7.45 MG/ML
10 INJECTION INTRAVENOUS ONCE
Status: COMPLETED | OUTPATIENT
Start: 2025-04-04 | End: 2025-04-04

## 2025-04-04 RX ADMIN — ACETAMINOPHEN 650 MG: 325 TABLET ORAL at 12:56

## 2025-04-04 RX ADMIN — SODIUM CHLORIDE 250 ML: 9 INJECTION, SOLUTION INTRAVENOUS at 12:07

## 2025-04-04 RX ADMIN — PIPERACILLIN AND TAZOBACTAM 4.5 G: 4; .5 INJECTION, POWDER, FOR SOLUTION INTRAVENOUS at 13:02

## 2025-04-04 RX ADMIN — PIPERACILLIN AND TAZOBACTAM 4.5 G: 4; .5 INJECTION, POWDER, FOR SOLUTION INTRAVENOUS at 22:27

## 2025-04-04 RX ADMIN — POTASSIUM CHLORIDE 10 MEQ: 7.46 INJECTION, SOLUTION INTRAVENOUS at 12:52

## 2025-04-04 RX ADMIN — PIPERACILLIN AND TAZOBACTAM 4.5 G: 4; .5 INJECTION, POWDER, FOR SOLUTION INTRAVENOUS at 05:51

## 2025-04-04 RX ADMIN — PANTOPRAZOLE SODIUM 40 MG: 40 INJECTION, POWDER, FOR SOLUTION INTRAVENOUS at 05:44

## 2025-04-04 RX ADMIN — OXYCODONE HYDROCHLORIDE 5 MG: 5 TABLET ORAL at 22:23

## 2025-04-04 RX ADMIN — OXYCODONE HYDROCHLORIDE 5 MG: 5 TABLET ORAL at 02:09

## 2025-04-04 RX ADMIN — OXYCODONE HYDROCHLORIDE 5 MG: 5 TABLET ORAL at 17:33

## 2025-04-04 RX ADMIN — OXYCODONE HYDROCHLORIDE 5 MG: 5 TABLET ORAL at 12:59

## 2025-04-04 RX ADMIN — SODIUM CHLORIDE, POTASSIUM CHLORIDE, SODIUM LACTATE AND CALCIUM CHLORIDE: 600; 310; 30; 20 INJECTION, SOLUTION INTRAVENOUS at 18:41

## 2025-04-04 RX ADMIN — OXYCODONE HYDROCHLORIDE 5 MG: 5 TABLET ORAL at 06:24

## 2025-04-04 RX ADMIN — PANTOPRAZOLE SODIUM 40 MG: 40 INJECTION, POWDER, FOR SOLUTION INTRAVENOUS at 17:12

## 2025-04-04 RX ADMIN — POTASSIUM CHLORIDE 10 MEQ: 7.46 INJECTION, SOLUTION INTRAVENOUS at 10:49

## 2025-04-04 RX ADMIN — SODIUM CHLORIDE 250 ML: 9 INJECTION, SOLUTION INTRAVENOUS at 14:13

## 2025-04-04 ASSESSMENT — PAIN DESCRIPTION - PAIN TYPE
TYPE: ACUTE PAIN
TYPE: ACUTE PAIN
TYPE: CHRONIC PAIN
TYPE: ACUTE PAIN
TYPE: CHRONIC PAIN
TYPE: CHRONIC PAIN
TYPE: ACUTE PAIN

## 2025-04-04 ASSESSMENT — ENCOUNTER SYMPTOMS
WEAKNESS: 1
CHILLS: 0
FEVER: 0
COUGH: 0
ABDOMINAL PAIN: 1
BLOOD IN STOOL: 1
DIZZINESS: 0

## 2025-04-04 ASSESSMENT — FIBROSIS 4 INDEX: FIB4 SCORE: 2.57

## 2025-04-04 NOTE — PROGRESS NOTES
DATE: 4/4/2025    HD 1 UGIB    INTERVAL EVENTS:  Vitals, Hb stable.  Improving melena  Patient very hungry    PHYSICAL EXAMINATION:  Vital Signs: /71   Pulse 90   Temp 36.4 °C (97.5 °F) (Temporal)   Resp 18   Wt 66.4 kg (146 lb 6.2 oz)   SpO2 96%   CONSTITUTIONAL: Alert, awake, oriented x3.  HEENT: Normocephalic, atraumatic. PERRL. Conjunctivae normal.  NECK: Supple  CARDIOVASCULAR: Normal rate, regular rhythm.  PULMONARY/CHEST: No respiratory distress.  ABDOMEN: Soft, mildly distended, mildly tender to palpation in the upper abdomen.  MUSCULOSKELETAL: Moving all extremities.  NEUROLOGICAL: CNII-XII grossly intact, no focal deficits.  SKIN: Warm and dry. No abrasions, lacerations.  PSYCHIATRIC: Behavior appropriate for situation.      LABORATORY VALUES:  Recent Labs     04/02/25 2109 04/03/25 0027 04/03/25  0554 04/03/25  1815 04/04/25  0017 04/04/25  0619   WBC 8.9 8.9  --   --  9.1  --    RBC 2.97* 2.90*  --   --  2.74*  --    HEMOGLOBIN 9.3* 9.1*   < > 9.0* 8.7* 8.9*   HEMATOCRIT 27.3* 27.0*   < > 27.1* 26.3* 27.0*   MCV 91.9 93.1  --   --  96.0  --    MCH 31.3 31.4  --   --  31.8  --    MCHC 34.1 33.7  --   --  33.1  --    RDW 70.3* 70.2*  --   --  72.8*  --    PLATELETCT 165 166  --   --  215  --    MPV 8.6* 8.8*  --   --  9.0  --     < > = values in this interval not displayed.     Recent Labs     04/02/25 2109 04/03/25 0027 04/04/25  0017   SODIUM 132* 133* 131*   POTASSIUM 3.2* 3.1* 3.5*   CHLORIDE 105 105 104   CO2 19* 18* 17*   GLUCOSE 103* 91 86   BUN 17 16 11   CREATININE 0.68 0.71 0.68   CALCIUM 7.8* 7.7* 7.7*     Recent Labs     04/01/25  1732 04/02/25  0500 04/02/25  2109 04/03/25  0027 04/04/25  0017   ASTSGOT 32   < > 22 20 24   ALTSGPT 25   < > 9 8 9   TBILIRUBIN 0.3   < > 0.7 0.8 0.5   ALKPHOSPHAT 67   < > 74 70 74   GLOBULIN  --   --  2.0 1.9 2.0   INR 1.16  --   --   --   --     < > = values in this interval not displayed.     Recent Labs     04/01/25  1732   APTT 23.9   INR  1.16       IMAGING:  DX-CHEST-LIMITED (1 VIEW)   Final Result         1.  Hazy right midlung opacity suggests subtle infiltrate   2.  Trace bilateral pleural effusions   3.  Left midlung pulmonary nodule, location and appearance suggest possible nipple shadow. Could be followed up with repeat chest x-ray with nipple markers to exclude pulmonary nodule.   4.  Atherosclerosis          ASSESSMENT AND PLAN:  70 year old male UGIB from duodenal ulcer.    -Hb stable  -Start a clear liquid diet  -Continue IV antibiotics  -Continue PPI  -Urgent upper endoscopy for worsening melena and/or hemodynamic compromise.  -No indication for surgery at this time.     ____________________________________     Jorge Mcgill M.D.

## 2025-04-04 NOTE — PROGRESS NOTES
Pt assisted to bathroom, when returning, pt began to feel dizzy and light headed. Pt assisted back to bed, BP found to be 79/53. Md Chakraborty notified. 250 bolus ordered and given.     1255: BP 96/54, pt without dizzyness

## 2025-04-04 NOTE — PROGRESS NOTES
[facility] Daily Progress Note    Date of Service  4/4/2025    Chief Complaint  Sam Potts is a 70 y.o. male admitted 4/2/2025 with UGIB and perforation     Hospital Course  No notes on file    Sam Potts is a 70 y.o. male who presented 4/2/2025 as a transfer positive facility for higher level of care and interventional radiology evaluation.      Patient was being evaluated at outside facility after having ongoing black tarry schools and bright red bloody bowel movements over the last week to week and a half.  Patient does endorse some generalized abdominal pain as well.      CT of the abdomen was performed and was concerning for perforation of the descending aspect of the duodenum.  Both general surgery and gastroenterology service consulted on the patient.  EGD is contraindicated at this time.  General surgery did not feel like a Darrel patch was appropriate, and recommended that the patient be seen by interventional radiology for embolization.  IR evaluated patient and recommended no plans for intervention.   Of note, the patient was found to be severely anemic.  He received a total of 4 units packed PRBC.  Hemoglobin currently stable at 9.1.       Interval Problem Update  4/3:  Seen and evaluated patient at the bedside. Patient reports using ibuprophen for low back pain. Also reports drinking alcohol. Quit couple weeks ago. Reports 1 small melanotic stool this morning. Denies chest pain. Denies nausea or vomiting. On RA. /83, pulse 74.   Hgb 9.3  CMP showed Na of 133, K 3.1, Co2 18, calcium 7.7  Received IV calcium gluconate   On IV potassium 10 meq   On IV PPI  On IV zosyn  Blood culture with no growth to date  IR evaluated patient and recommended conservative management   Trend hgb   Repeat CBC in am to monitor hgb level   Repeat CMP in am to monitor renal function and electrolyte imbalance.    4/4:  Seen and evaluated pt at the bedside. Denies nausea and vomiting. Reports he is very  hungry. Started on clear liquid diet.   Hgb 8.9  Reports mild epigastric pain   Continue to have melena but patient stated it is improving   He went to the bathroom this morning and reports felt dizzy and was found to have low BP. Will administer 250 ns bolus and recheck hgb level.   K is 3.5 and 20 meq IV K is ordered   Magnesium 1.8  Trend hgb   If brisk bleeding is noted, reconsult IR for possible embolization           I have discussed this patient's plan of care and discharge plan at IDT rounds today with Case Management, Nursing, Nursing leadership, and other members of the IDT team.    Consultants/Specialty  General surgery   GI   IR     Code Status  Full Code    Disposition  Not Medically Cleared  I have placed the appropriate orders for post-discharge needs.    Review of Systems  Review of Systems   Constitutional:  Negative for chills, fever and malaise/fatigue.   Respiratory:  Negative for cough.    Cardiovascular:  Negative for chest pain and leg swelling.   Gastrointestinal:  Positive for abdominal pain and blood in stool.   Neurological:  Positive for weakness. Negative for dizziness.        Physical Exam  Temp:  [36.3 °C (97.3 °F)-36.9 °C (98.4 °F)] 36.4 °C (97.5 °F)  Pulse:  [76-99] 90  Resp:  [16-18] 18  BP: ()/(61-81) 106/71  SpO2:  [92 %-96 %] 96 %    Physical Exam  Constitutional:       Appearance: Normal appearance.   HENT:      Head: Normocephalic.      Mouth/Throat:      Mouth: Mucous membranes are moist.   Cardiovascular:      Rate and Rhythm: Normal rate.   Pulmonary:      Effort: Pulmonary effort is normal.   Abdominal:      General: Abdomen is flat.      Palpations: Abdomen is soft.      Tenderness: There is abdominal tenderness.      Comments: Epigastric tenderness   Musculoskeletal:         General: Normal range of motion.   Skin:     General: Skin is warm.   Neurological:      General: No focal deficit present.      Mental Status: He is alert.         Fluids    Intake/Output  Summary (Last 24 hours) at 4/4/2025 1143  Last data filed at 4/4/2025 0821  Gross per 24 hour   Intake 0 ml   Output 1700 ml   Net -1700 ml        Laboratory  Recent Labs     04/02/25 2109 04/03/25 0027 04/03/25  0554 04/03/25  1815 04/04/25  0017 04/04/25  0619   WBC 8.9 8.9  --   --  9.1  --    RBC 2.97* 2.90*  --   --  2.74*  --    HEMOGLOBIN 9.3* 9.1*   < > 9.0* 8.7* 8.9*   HEMATOCRIT 27.3* 27.0*   < > 27.1* 26.3* 27.0*   MCV 91.9 93.1  --   --  96.0  --    MCH 31.3 31.4  --   --  31.8  --    MCHC 34.1 33.7  --   --  33.1  --    RDW 70.3* 70.2*  --   --  72.8*  --    PLATELETCT 165 166  --   --  215  --    MPV 8.6* 8.8*  --   --  9.0  --     < > = values in this interval not displayed.     Recent Labs     04/02/25 2109 04/03/25 0027 04/04/25  0017   SODIUM 132* 133* 131*   POTASSIUM 3.2* 3.1* 3.5*   CHLORIDE 105 105 104   CO2 19* 18* 17*   GLUCOSE 103* 91 86   BUN 17 16 11   CREATININE 0.68 0.71 0.68   CALCIUM 7.8* 7.7* 7.7*     Recent Labs     04/01/25  1732   APTT 23.9   INR 1.16               Imaging  DX-CHEST-LIMITED (1 VIEW)   Final Result         1.  Hazy right midlung opacity suggests subtle infiltrate   2.  Trace bilateral pleural effusions   3.  Left midlung pulmonary nodule, location and appearance suggest possible nipple shadow. Could be followed up with repeat chest x-ray with nipple markers to exclude pulmonary nodule.   4.  Atherosclerosis            Assessment/Plan  * UGIB (upper gastrointestinal bleed)- (present on admission)  Assessment & Plan  Reports using ibuprofen for low back pain  Also reports drinking alcohol and quit drinking couple weeks ago  Patient reports a 1-1 and half week history of melanotic and bright red bloody bowel movements  CT imaging was obtained concerning for perforation of the descending outside of the duodenum.  Case discussed with both general surgery and gastroenterology.    EGD is contraindicated at this time  Patient is now status post 4 units of packed  PRBC  Trend hgb   PPI IV BID  IR recommended conservative management   NPO   Avoid NSAIDs  Transfuse for hemoglobin < 7     Duodenal perforation (HCC)  Assessment & Plan  CT imaging:  Findings are concerning for perforation of the descending aspect of the duodenum, and correlation with endoscopy is suggested.   General surgery consulted.  No surgical intervention at this time.  They are not recommending a Darrel patch.    IR recommended conservative management   Continue with IV Zosyn  Blood culture with no growth to date   If brisk bleeding is noted, reconsult IR for possible embolization       Acute blood loss anemia  Assessment & Plan  Patient has received total 4 units packed PRBC from outside facility  Most recent hemoglobin of 9.1  Follow-up repeat CBC  Every 6 hour H&H  Transfuse for hemoglobin less than 7      Falls- (present on admission)  Assessment & Plan  PT/OT evaluation     History of vertebral fracture- (present on admission)  Assessment & Plan  Compression deformity is noted involving the T8 vertebrae, and is favored represent sequela of chronic rather than acute process.     Rib fractures- (present on admission)  Assessment & Plan  Multiple rib fractures are present, variable age. Fractures involving posterior aspects of left ribs may be acute or subacute.  Multimodal pain control     Hypokalemia- (present on admission)  Assessment & Plan  Replete and monitor          VTE prophylaxis: contraindicated due to GI bleed    I have performed a physical exam and reviewed and updated ROS and Plan today (4/4/2025). In review of yesterday's note (4/3/2025), there are no changes except as documented above.    Patient has a high medical complexity, complex decision making and is at high risk of complication, morbidity, and mortality    Greater than 51 minutes spent prepping to see patient (e.g. review of tests) obtaining and/or reviewing separately obtained history. Performing a medically appropriate  examination and/ evaluation.  Counseling and educating the patient/family/caregiver.  Ordering medications, tests, or procedures.  Referring and communicating with other health care professionals.  Documenting clinical information in EPIC.  Independently interpreting results and communicating results to patient/family/caregiver.  Care coordination

## 2025-04-04 NOTE — CARE PLAN
The patient is Stable - Low risk of patient condition declining or worsening    Shift Goals  Clinical Goals: Monitor for s/s of bleeding  Patient Goals: Eat food  Family Goals: LIZZIE    Progress made toward(s) clinical / shift goals:    Problem: Knowledge Deficit - Standard  Goal: Patient and family/care givers will demonstrate understanding of plan of care, disease process/condition, diagnostic tests and medications  Outcome: Progressing     Problem: Fall Risk  Goal: Patient will remain free from falls  Outcome: Progressing  Note: Bed alarm connected correctly. Pt educated to call for assistance.       Patient is not progressing towards the following goals:

## 2025-04-04 NOTE — PROGRESS NOTES
Bedside report received from off going RN/tech: Keyanna, assumed care of patient.     Fall Risk Score: NO RISK  Fall risk interventions in place: Provide patient/family education based on risk assessment, Educate patient/family to call staff for assistance when getting out of bed, Place fall precaution signage outside patient door, Utilize bed/chair fall alarm, Notify charge of high risk for huddle, and Bed alarm connected correctly  Bed type: Regular (Umair Score less than 17 interventions in place)  Patient on cardiac monitor: Yes  IVF/IV medications: Not Applicable   Oxygen: Room Air  Bedside sitter: Not Applicable   Isolation: Not applicable

## 2025-04-04 NOTE — CARE PLAN
The patient is Watcher - Medium risk of patient condition declining or worsening    Shift Goals  Clinical Goals: IV ABX, monitor I&O signs of bleeding  Patient Goals: hungry  Family Goals: LIZZIE    Progress made toward(s) clinical / shift goals:    Problem: Knowledge Deficit - Standard  Goal: Patient and family/care givers will demonstrate understanding of plan of care, disease process/condition, diagnostic tests and medications  Outcome: Progressing     Problem: Pain - Standard  Goal: Alleviation of pain or a reduction in pain to the patient’s comfort goal  Outcome: Progressing     Problem: Fall Risk  Goal: Patient will remain free from falls  Outcome: Progressing  Note: Laverne borden assessment and interventions documented in flow sheets       Patient is not progressing towards the following goals:

## 2025-04-04 NOTE — PROGRESS NOTES
Monitor Summary  Rhythm:SR  Rate: 70-99  Ectopy: R-PVC/ R-PAC/   Measurement: 0.16/0.10/0.40

## 2025-04-04 NOTE — PROGRESS NOTES
Bedside report received from off going RN/tech: Elvira, assumed care of patient.     Fall Risk Score: HIGH RISK  Fall risk interventions in place: Place yellow fall risk ID band on patient, Provide patient/family education based on risk assessment, Educate patient/family to call staff for assistance when getting out of bed, Place fall precaution signage outside patient door, and Bed alarm connected correctly  Bed type: Regular (Umair Score less than 17 interventions in place)  Patient on cardiac monitor: Yes  IVF/IV medications: Not Applicable   Oxygen: Room Air  Bedside sitter: Not Applicable   Isolation: Not applicable

## 2025-04-05 ENCOUNTER — APPOINTMENT (OUTPATIENT)
Dept: RADIOLOGY | Facility: MEDICAL CENTER | Age: 70
DRG: 357 | End: 2025-04-05
Payer: MEDICARE

## 2025-04-05 LAB
ABO GROUP BLD: NORMAL
ALBUMIN SERPL BCP-MCNC: 2.7 G/DL (ref 3.2–4.9)
ALBUMIN/GLOB SERPL: 1.6 G/DL
ALP SERPL-CCNC: 63 U/L (ref 30–99)
ALT SERPL-CCNC: 6 U/L (ref 2–50)
ANION GAP SERPL CALC-SCNC: 8 MMOL/L (ref 7–16)
AST SERPL-CCNC: 18 U/L (ref 12–45)
BARCODED ABORH UBTYP: 5100
BARCODED PRD CODE UBPRD: NORMAL
BARCODED UNIT NUM UBUNT: NORMAL
BILIRUB SERPL-MCNC: 0.3 MG/DL (ref 0.1–1.5)
BLD GP AB SCN SERPL QL: NORMAL
BUN SERPL-MCNC: 17 MG/DL (ref 8–22)
CALCIUM ALBUM COR SERPL-MCNC: 8.6 MG/DL (ref 8.5–10.5)
CALCIUM SERPL-MCNC: 7.6 MG/DL (ref 8.5–10.5)
CHLORIDE SERPL-SCNC: 108 MMOL/L (ref 96–112)
CO2 SERPL-SCNC: 18 MMOL/L (ref 20–33)
COMPONENT R 8504R: NORMAL
CREAT SERPL-MCNC: 0.74 MG/DL (ref 0.5–1.4)
ERYTHROCYTE [DISTWIDTH] IN BLOOD BY AUTOMATED COUNT: 76.7 FL (ref 35.9–50)
GFR SERPLBLD CREATININE-BSD FMLA CKD-EPI: 98 ML/MIN/1.73 M 2
GLOBULIN SER CALC-MCNC: 1.7 G/DL (ref 1.9–3.5)
GLUCOSE SERPL-MCNC: 104 MG/DL (ref 65–99)
HCT VFR BLD AUTO: 21.2 % (ref 42–52)
HCT VFR BLD AUTO: 23.1 % (ref 42–52)
HCT VFR BLD AUTO: 23.6 % (ref 42–52)
HCT VFR BLD AUTO: 24.3 % (ref 42–52)
HGB BLD-MCNC: 6.6 G/DL (ref 14–18)
HGB BLD-MCNC: 7.9 G/DL (ref 14–18)
HGB BLD-MCNC: 7.9 G/DL (ref 14–18)
HGB BLD-MCNC: 8 G/DL (ref 14–18)
MCH RBC QN AUTO: 31.1 PG (ref 27–33)
MCHC RBC AUTO-ENTMCNC: 31.1 G/DL (ref 32.3–36.5)
MCV RBC AUTO: 100 FL (ref 81.4–97.8)
PLATELET # BLD AUTO: 251 K/UL (ref 164–446)
PMV BLD AUTO: 9.2 FL (ref 9–12.9)
POTASSIUM SERPL-SCNC: 3.2 MMOL/L (ref 3.6–5.5)
PRODUCT TYPE UPROD: NORMAL
PROT SERPL-MCNC: 4.4 G/DL (ref 6–8.2)
RBC # BLD AUTO: 2.12 M/UL (ref 4.7–6.1)
RH BLD: NORMAL
SODIUM SERPL-SCNC: 134 MMOL/L (ref 135–145)
UNIT STATUS USTAT: NORMAL
WBC # BLD AUTO: 9.8 K/UL (ref 4.8–10.8)

## 2025-04-05 PROCEDURE — 86900 BLOOD TYPING SEROLOGIC ABO: CPT

## 2025-04-05 PROCEDURE — 700105 HCHG RX REV CODE 258: Performed by: STUDENT IN AN ORGANIZED HEALTH CARE EDUCATION/TRAINING PROGRAM

## 2025-04-05 PROCEDURE — 85027 COMPLETE CBC AUTOMATED: CPT

## 2025-04-05 PROCEDURE — A9270 NON-COVERED ITEM OR SERVICE: HCPCS | Mod: JZ | Performed by: NURSE PRACTITIONER

## 2025-04-05 PROCEDURE — 30233N1 TRANSFUSION OF NONAUTOLOGOUS RED BLOOD CELLS INTO PERIPHERAL VEIN, PERCUTANEOUS APPROACH: ICD-10-PCS | Performed by: NURSE PRACTITIONER

## 2025-04-05 PROCEDURE — B41B1ZZ FLUOROSCOPY OF OTHER INTRA-ABDOMINAL ARTERIES USING LOW OSMOLAR CONTRAST: ICD-10-PCS | Performed by: RADIOLOGY

## 2025-04-05 PROCEDURE — 700102 HCHG RX REV CODE 250 W/ 637 OVERRIDE(OP): Performed by: STUDENT IN AN ORGANIZED HEALTH CARE EDUCATION/TRAINING PROGRAM

## 2025-04-05 PROCEDURE — 36245 INS CATH ABD/L-EXT ART 1ST: CPT | Mod: XU

## 2025-04-05 PROCEDURE — 700102 HCHG RX REV CODE 250 W/ 637 OVERRIDE(OP): Mod: JZ | Performed by: NURSE PRACTITIONER

## 2025-04-05 PROCEDURE — B4141ZZ FLUOROSCOPY OF SUPERIOR MESENTERIC ARTERY USING LOW OSMOLAR CONTRAST: ICD-10-PCS | Performed by: RADIOLOGY

## 2025-04-05 PROCEDURE — 86901 BLOOD TYPING SEROLOGIC RH(D): CPT

## 2025-04-05 PROCEDURE — 75726 ARTERY X-RAYS ABDOMEN: CPT | Mod: XU

## 2025-04-05 PROCEDURE — 36430 TRANSFUSION BLD/BLD COMPNT: CPT

## 2025-04-05 PROCEDURE — 700111 HCHG RX REV CODE 636 W/ 250 OVERRIDE (IP): Mod: JZ

## 2025-04-05 PROCEDURE — 86923 COMPATIBILITY TEST ELECTRIC: CPT

## 2025-04-05 PROCEDURE — P9016 RBC LEUKOCYTES REDUCED: HCPCS

## 2025-04-05 PROCEDURE — 700117 HCHG RX CONTRAST REV CODE 255

## 2025-04-05 PROCEDURE — 85014 HEMATOCRIT: CPT

## 2025-04-05 PROCEDURE — 86850 RBC ANTIBODY SCREEN: CPT

## 2025-04-05 PROCEDURE — 36247 INS CATH ABD/L-EXT ART 3RD: CPT

## 2025-04-05 PROCEDURE — 85018 HEMOGLOBIN: CPT | Mod: 91

## 2025-04-05 PROCEDURE — 770020 HCHG ROOM/CARE - TELE (206)

## 2025-04-05 PROCEDURE — 75774 ARTERY X-RAY EACH VESSEL: CPT | Mod: XU

## 2025-04-05 PROCEDURE — 04L33DZ OCCLUSION OF HEPATIC ARTERY WITH INTRALUMINAL DEVICE, PERCUTANEOUS APPROACH: ICD-10-PCS | Performed by: RADIOLOGY

## 2025-04-05 PROCEDURE — 36415 COLL VENOUS BLD VENIPUNCTURE: CPT

## 2025-04-05 PROCEDURE — 36500 INSERTION OF CATHETER VEIN: CPT

## 2025-04-05 PROCEDURE — 99233 SBSQ HOSP IP/OBS HIGH 50: CPT | Performed by: STUDENT IN AN ORGANIZED HEALTH CARE EDUCATION/TRAINING PROGRAM

## 2025-04-05 PROCEDURE — A9270 NON-COVERED ITEM OR SERVICE: HCPCS | Performed by: STUDENT IN AN ORGANIZED HEALTH CARE EDUCATION/TRAINING PROGRAM

## 2025-04-05 PROCEDURE — 80053 COMPREHEN METABOLIC PANEL: CPT

## 2025-04-05 PROCEDURE — 700111 HCHG RX REV CODE 636 W/ 250 OVERRIDE (IP): Performed by: STUDENT IN AN ORGANIZED HEALTH CARE EDUCATION/TRAINING PROGRAM

## 2025-04-05 PROCEDURE — 700111 HCHG RX REV CODE 636 W/ 250 OVERRIDE (IP): Mod: JZ | Performed by: RADIOLOGY

## 2025-04-05 PROCEDURE — 99232 SBSQ HOSP IP/OBS MODERATE 35: CPT | Performed by: STUDENT IN AN ORGANIZED HEALTH CARE EDUCATION/TRAINING PROGRAM

## 2025-04-05 PROCEDURE — 37244 VASC EMBOLIZE/OCCLUDE BLEED: CPT

## 2025-04-05 RX ORDER — POTASSIUM CHLORIDE 7.45 MG/ML
10 INJECTION INTRAVENOUS
Status: DISPENSED | OUTPATIENT
Start: 2025-04-05 | End: 2025-04-05

## 2025-04-05 RX ORDER — POTASSIUM CHLORIDE 1500 MG/1
40 TABLET, EXTENDED RELEASE ORAL ONCE
Status: COMPLETED | OUTPATIENT
Start: 2025-04-05 | End: 2025-04-05

## 2025-04-05 RX ORDER — ONDANSETRON 2 MG/ML
4 INJECTION INTRAMUSCULAR; INTRAVENOUS PRN
Status: ACTIVE | OUTPATIENT
Start: 2025-04-05 | End: 2025-04-05

## 2025-04-05 RX ORDER — SODIUM CHLORIDE 9 MG/ML
500 INJECTION, SOLUTION INTRAVENOUS
Status: ACTIVE | OUTPATIENT
Start: 2025-04-05 | End: 2025-04-05

## 2025-04-05 RX ORDER — MIDAZOLAM HYDROCHLORIDE 1 MG/ML
INJECTION INTRAMUSCULAR; INTRAVENOUS
Status: COMPLETED
Start: 2025-04-05 | End: 2025-04-05

## 2025-04-05 RX ORDER — POTASSIUM CHLORIDE 7.45 MG/ML
10 INJECTION INTRAVENOUS
Status: COMPLETED | OUTPATIENT
Start: 2025-04-05 | End: 2025-04-05

## 2025-04-05 RX ORDER — MIDAZOLAM HYDROCHLORIDE 1 MG/ML
.5-2 INJECTION INTRAMUSCULAR; INTRAVENOUS PRN
Status: ACTIVE | OUTPATIENT
Start: 2025-04-05 | End: 2025-04-05

## 2025-04-05 RX ADMIN — POTASSIUM CHLORIDE 10 MEQ: 7.46 INJECTION, SOLUTION INTRAVENOUS at 13:30

## 2025-04-05 RX ADMIN — OXYCODONE HYDROCHLORIDE 5 MG: 5 TABLET ORAL at 17:56

## 2025-04-05 RX ADMIN — PANTOPRAZOLE SODIUM 40 MG: 40 INJECTION, POWDER, FOR SOLUTION INTRAVENOUS at 05:03

## 2025-04-05 RX ADMIN — PANTOPRAZOLE SODIUM 40 MG: 40 INJECTION, POWDER, FOR SOLUTION INTRAVENOUS at 18:00

## 2025-04-05 RX ADMIN — SODIUM CHLORIDE, POTASSIUM CHLORIDE, SODIUM LACTATE AND CALCIUM CHLORIDE: 600; 310; 30; 20 INJECTION, SOLUTION INTRAVENOUS at 11:48

## 2025-04-05 RX ADMIN — AMPICILLIN AND SULBACTAM 3 G: 1; 2 INJECTION, POWDER, FOR SOLUTION INTRAMUSCULAR; INTRAVENOUS at 14:06

## 2025-04-05 RX ADMIN — POTASSIUM CHLORIDE 10 MEQ: 7.46 INJECTION, SOLUTION INTRAVENOUS at 08:33

## 2025-04-05 RX ADMIN — MIDAZOLAM HYDROCHLORIDE 1 MG: 1 INJECTION, SOLUTION INTRAMUSCULAR; INTRAVENOUS at 10:13

## 2025-04-05 RX ADMIN — FENTANYL CITRATE 50 MCG: 50 INJECTION, SOLUTION INTRAMUSCULAR; INTRAVENOUS at 10:13

## 2025-04-05 RX ADMIN — OXYCODONE HYDROCHLORIDE 5 MG: 5 TABLET ORAL at 22:24

## 2025-04-05 RX ADMIN — AMPICILLIN AND SULBACTAM 3 G: 1; 2 INJECTION, POWDER, FOR SOLUTION INTRAMUSCULAR; INTRAVENOUS at 18:07

## 2025-04-05 RX ADMIN — POTASSIUM CHLORIDE 10 MEQ: 7.46 INJECTION, SOLUTION INTRAVENOUS at 14:13

## 2025-04-05 RX ADMIN — OXYCODONE HYDROCHLORIDE 5 MG: 5 TABLET ORAL at 12:28

## 2025-04-05 RX ADMIN — POTASSIUM CHLORIDE 40 MEQ: 1500 TABLET, EXTENDED RELEASE ORAL at 01:40

## 2025-04-05 RX ADMIN — FENTANYL CITRATE 50 MCG: 50 INJECTION, SOLUTION INTRAMUSCULAR; INTRAVENOUS at 10:00

## 2025-04-05 RX ADMIN — IOHEXOL 124 ML: 300 INJECTION, SOLUTION INTRAVENOUS at 12:00

## 2025-04-05 RX ADMIN — MIDAZOLAM HYDROCHLORIDE 0.5 MG: 1 INJECTION, SOLUTION INTRAMUSCULAR; INTRAVENOUS at 10:34

## 2025-04-05 RX ADMIN — POTASSIUM CHLORIDE 10 MEQ: 7.46 INJECTION, SOLUTION INTRAVENOUS at 12:00

## 2025-04-05 RX ADMIN — PIPERACILLIN AND TAZOBACTAM 4.5 G: 4; .5 INJECTION, POWDER, FOR SOLUTION INTRAVENOUS at 05:05

## 2025-04-05 RX ADMIN — OXYCODONE HYDROCHLORIDE 5 MG: 5 TABLET ORAL at 01:44

## 2025-04-05 RX ADMIN — OXYCODONE HYDROCHLORIDE 5 MG: 5 TABLET ORAL at 08:30

## 2025-04-05 ASSESSMENT — ENCOUNTER SYMPTOMS
FEVER: 0
DIZZINESS: 0
COUGH: 0
CHILLS: 0
BLOOD IN STOOL: 1
WEAKNESS: 1
ABDOMINAL PAIN: 1

## 2025-04-05 ASSESSMENT — PAIN DESCRIPTION - PAIN TYPE
TYPE: ACUTE PAIN

## 2025-04-05 ASSESSMENT — FIBROSIS 4 INDEX: FIB4 SCORE: 2.02

## 2025-04-05 NOTE — CARE PLAN
The patient is Watcher - Medium risk of patient condition declining or worsening    Shift Goals  Clinical Goals: Hemodynamic stability, IR< embolization  Patient Goals: Feel better  Family Goals: LIZZIE    Progress made toward(s) clinical / shift goals:    Problem: Knowledge Deficit - Standard  Goal: Patient and family/care givers will demonstrate understanding of plan of care, disease process/condition, diagnostic tests and medications  Outcome: Progressing  Note: The patient is showing gradual improvement in understanding their gi bleed, though there are still some gaps in knowledge. While they have gained a better understanding of [specific aspects of care, e.g., medication regimen, self-care techniques], they continue to need reinforcement in areas such as procedures and POC. With continued education, the patient is progressing toward better managing their health and adhering to the prescribed care plan.     Problem: Skin Integrity  Goal: Skin integrity is maintained or improved  Outcome: Progressing  Note: All precautions for skin care implemented. Pts skin is intact on assessment with some bruising, and excoriation on the sacrum from frequest Bms. All interventions necessary in place, and pt educated on skin care.          Patient is not progressing towards the following goals:

## 2025-04-05 NOTE — OR SURGEON
Immediate Post- Operative Note        Findings: satisfactory coil embo of GDA. NO ACTIVE BLEED PRESENT AT THIS TIME      Procedure(s): CELIAC, JARVIS, GDA ANGIO, COIL EMBO GDA  SMA ANGIO  R CFA-ILIAC ANGIO FOR ANGIO-SEAL  6F ANGIOSEAL PLACED      Estimated Blood Loss: Less than 15 ml  (FLUSHES)        Complications: None            4/5/2025     11:18 AM     Rosendo Osei M.D.

## 2025-04-05 NOTE — PROGRESS NOTES
DATE: 4/5/2025    HD 3 UGIB    INTERVAL EVENTS:  Required transfusion of 1 PRBC for anemia to 6.6 today  Bloody BMs overnight    PHYSICAL EXAMINATION:  Vital Signs: /71   Pulse 79   Temp 36.9 °C (98.4 °F) (Temporal)   Resp 18   Wt 64.4 kg (141 lb 15.6 oz)   SpO2 97%   CONSTITUTIONAL: Alert, awake, oriented x3.  HEENT: Normocephalic, atraumatic. PERRL. Conjunctivae normal.  NECK: Supple  CARDIOVASCULAR: Normal rate, regular rhythm.  PULMONARY/CHEST: No respiratory distress.  ABDOMEN: Soft, mildly distended, mildly tender to palpation in the upper abdomen.  MUSCULOSKELETAL: Moving all extremities.  NEUROLOGICAL: CNII-XII grossly intact, no focal deficits.  SKIN: Warm and dry. No abrasions, lacerations.  PSYCHIATRIC: Behavior appropriate for situation.      LABORATORY VALUES:  Recent Labs     04/03/25  0027 04/03/25  0554 04/04/25  0017 04/04/25  0619 04/04/25  1237 04/04/25  1812 04/05/25  0012   WBC 8.9  --  9.1  --   --   --  9.8   RBC 2.90*  --  2.74*  --   --   --  2.12*   HEMOGLOBIN 9.1*   < > 8.7*   < > 7.6* 7.3* 6.6*   HEMATOCRIT 27.0*   < > 26.3*   < > 23.7* 23.5* 21.2*   MCV 93.1  --  96.0  --   --   --  100.0*   MCH 31.4  --  31.8  --   --   --  31.1   MCHC 33.7  --  33.1  --   --   --  31.1*   RDW 70.2*  --  72.8*  --   --   --  76.7*   PLATELETCT 166  --  215  --   --   --  251   MPV 8.8*  --  9.0  --   --   --  9.2    < > = values in this interval not displayed.     Recent Labs     04/03/25  0027 04/04/25  0017 04/05/25  0012   SODIUM 133* 131* 134*   POTASSIUM 3.1* 3.5* 3.2*   CHLORIDE 105 104 108   CO2 18* 17* 18*   GLUCOSE 91 86 104*   BUN 16 11 17   CREATININE 0.71 0.68 0.74   CALCIUM 7.7* 7.7* 7.6*     Recent Labs     04/03/25  0027 04/04/25  0017 04/05/25  0012   ASTSGOT 20 24 18   ALTSGPT 8 9 6   TBILIRUBIN 0.8 0.5 0.3   ALKPHOSPHAT 70 74 63   GLOBULIN 1.9 2.0 1.7*             IMAGING:  DX-CHEST-LIMITED (1 VIEW)   Final Result         1.  Hazy right midlung opacity suggests subtle  infiltrate   2.  Trace bilateral pleural effusions   3.  Left midlung pulmonary nodule, location and appearance suggest possible nipple shadow. Could be followed up with repeat chest x-ray with nipple markers to exclude pulmonary nodule.   4.  Atherosclerosis          ASSESSMENT AND PLAN:  70 year old male UGIB from duodenal ulcer.    -Hb down trending with ongoing bloody BMs  -Recommend IR consult for angioembolization  -NPO  -Continue IV antibiotics  -Continue PPI  -No indication for surgery at this time     ____________________________________     Jorge Mcgill M.D.

## 2025-04-05 NOTE — PROGRESS NOTES
NOC ApRN CROSS COVER NOTE    Notified by bedside RN of hemoglobin 6.6.  Patient has reported having bloody stools for the last week we will order 1 unit PRBC to replace.      Heather Cruz, MSN, APRN  [facility]    Please note this dictation was created using voice recognition software.  I have made every reasonable attempt to correct obvious errors, but there may be errors of grammar and possibly content that I did not discover before finalizing the note.

## 2025-04-05 NOTE — PROGRESS NOTES
Bedside report received from off going RN/tech: Rony assumed care of patient.     Fall Risk Score: HIGH RISK  Fall risk interventions in place: Place yellow fall risk ID band on patient, Provide patient/family education based on risk assessment, Educate patient/family to call staff for assistance when getting out of bed, Place fall precaution signage outside patient door, Utilize bed/chair fall alarm, Notify charge of high risk for huddle, and Bed alarm connected correctly  Bed type: Regular (Umair Score less than 17 interventions in place)  Patient on cardiac monitor: Yes  IVF/IV medications: Infusion per MAR (List Med(s)) ABX  Oxygen: Room Air  Bedside sitter: Not Applicable   Isolation: Not applicable

## 2025-04-05 NOTE — PROGRESS NOTES
Bedside report received from off going RN/tech: Sharmin, assumed care of patient.     Fall Risk Score: HIGH RISK  Fall risk interventions in place: Place yellow fall risk ID band on patient, Provide patient/family education based on risk assessment, Educate patient/family to call staff for assistance when getting out of bed, Place fall precaution signage outside patient door, Place patient in room close to nursing station, Utilize bed/chair fall alarm, Notify charge of high risk for huddle, and Bed alarm connected correctly  Bed type: Regular (Umair Score less than 17 interventions in place)  Patient on cardiac monitor: Yes  IVF/IV medications: Infusion per MAR (List Med(s)) RBC ABX, Kriders  Oxygen: Room Air  Bedside sitter: Not Applicable   Isolation: Not applicable

## 2025-04-05 NOTE — PROGRESS NOTES
Pt presents to IR2. Pt was consented by MD at bedside, confirmed by this RN and consent at bedside. Pt transferred to IR table in supine position. Patient underwent an Upper GI Bleed embolization by Dr. Osei. Procedure site was marked by MD and verified using imaging guidance. Pt placed on monitor, prepped and draped in a sterile fashion. Vitals were taken every 5 minutes and remained stable during procedure (see doc flow sheet for results). CO2 waveform capnography was monitored and remained WNL throughout procedure.     Report called to Anabel BROOKE. Pt transported by stretcher with RN to [id number].       Implants during this procedure:     Penumbra Crystal Coil Standard 3 mm x 20 cm Implanted at 1034  REF: [id number]  LOT: [id number]  EXP: 2031-06-27    Penumbra Packing Coil 60 cm implanted at 1039  REF: [id number]  LOT: [id number]  EXP: 2031-12-02    Penumbra Packing Coil 30 cm implanted at 1043  REF: [id number]  LOT: [id number]  EXP: 2032-12-07    Penumbra Packing Coil 30 cm implanted at 1048  REF: [id number]  LOT: [id number]  EXP: 2032-11-23    Penumbra Packing Coil 15 cm implanted at 1051  REF: [id number]  LOT: [id number]  EXP: 2032-09-02    Penumbra Packing Coil 5 cm deployed at 1054  REF: [id number]  LOT: [id number]  EXP: 2032-09-30    Penumbra Packing Coil 5 cm deployed at 1055  REF: [id number]  LOT [id number]  EXP: 2032-09-30    Penumbra Packing Coil 5 cm deployed at 1057  REF: [id number]  LOT [id number]  EXP: 2032-09-30    Penumbra Crystal Coil Standard 4 mm x 10 cm deployed at 1101  REF: [id number]  LOT: [id number]  EXP: 2031-11-08    6F AngioSeal VIP applied to Right Femoral Groin Access Site at 1112  REF: [id number]  LOT: [id number]  EXP: 2025-10-30     normal...

## 2025-04-05 NOTE — PROGRESS NOTES
Hospital Medicine Daily Progress Note    Date of Service  4/5/2025    Chief Complaint  Sam Potts is a 70 y.o. male admitted 4/2/2025 with UGIB and perforation     Hospital Course  No notes on file    Sam Potts is a 70 y.o. male who presented 4/2/2025 as a transfer positive facility for higher level of care and interventional radiology evaluation.      Patient was being evaluated at outside facility after having ongoing black tarry schools and bright red bloody bowel movements over the last week to week and a half.  Patient does endorse some generalized abdominal pain as well.      CT of the abdomen was performed and was concerning for perforation of the descending aspect of the duodenum.  Both general surgery and gastroenterology service consulted on the patient.  EGD is contraindicated at this time.  General surgery did not feel like a Darrel patch was appropriate, and recommended that the patient be seen by interventional radiology for embolization.  IR evaluated patient and recommended no plans for intervention.   Of note, the patient was found to be severely anemic.  He received a total of 4 units packed PRBC.  Hemoglobin currently stable at 9.1.       Interval Problem Update  4/3:  Seen and evaluated patient at the bedside. Patient reports using ibuprophen for low back pain. Also reports drinking alcohol. Quit couple weeks ago. Reports 1 small melanotic stool this morning. Denies chest pain. Denies nausea or vomiting. On RA. /83, pulse 74.   Hgb 9.3  CMP showed Na of 133, K 3.1, Co2 18, calcium 7.7  Received IV calcium gluconate   On IV potassium 10 meq   On IV PPI  On IV zosyn  Blood culture with no growth to date  IR evaluated patient and recommended conservative management   Trend hgb   Repeat CBC in am to monitor hgb level   Repeat CMP in am to monitor renal function and electrolyte imbalance.    4/4:  Seen and evaluated pt at the bedside. Denies nausea and vomiting. Reports he is very  hungry. Started on clear liquid diet.   Hgb 8.9  Reports mild epigastric pain   Continue to have melena but patient stated it is improving   He went to the bathroom this morning and reports felt dizzy and was found to have low BP. Will administer 250 ns bolus and recheck hgb level.   K is 3.5 and 20 meq IV K is ordered   Magnesium 1.8  Trend hgb   If brisk bleeding is noted, reconsult IR for possible embolization     4/5:  Seen and evaluated patient at the bedside. Denies fever or chills. Reports abdominal discomfort. Had 7 bloody bowel movement since yesterday. Drop  in hgb to 6.6 and 1 unit of PRBC administered.   Dr. Osei IR consulted and patient is now status post. satisfactory coil embo of GDA. NO ACTIVE BLEED PRESENT AT THIS TIME  Trend hgb   Antibiotics switched from zosyn to IV unasyn  Blood culture no growth to date        I have discussed this patient's plan of care and discharge plan at IDT rounds today with Case Management, Nursing, Nursing leadership, and other members of the IDT team.    Consultants/Specialty  General surgery   GI   IR     Code Status  Full Code    Disposition  Not Medically Cleared  I have placed the appropriate orders for post-discharge needs.    Review of Systems  Review of Systems   Constitutional:  Negative for chills, fever and malaise/fatigue.   Respiratory:  Negative for cough.    Cardiovascular:  Negative for chest pain and leg swelling.   Gastrointestinal:  Positive for abdominal pain and blood in stool.   Neurological:  Positive for weakness. Negative for dizziness.        Physical Exam  Temp:  [36 °C (96.8 °F)-37 °C (98.6 °F)] 36.9 °C (98.4 °F)  Pulse:  [72-99] 77  Resp:  [9-20] 12  BP: ()/(8-81) 128/73  SpO2:  [95 %-100 %] 95 %    Physical Exam  Constitutional:       Appearance: Normal appearance.   HENT:      Head: Normocephalic.      Mouth/Throat:      Mouth: Mucous membranes are moist.   Cardiovascular:      Rate and Rhythm: Normal rate.   Pulmonary:      Effort:  Pulmonary effort is normal.   Abdominal:      General: Abdomen is flat.      Palpations: Abdomen is soft.      Tenderness: There is abdominal tenderness.      Comments: Epigastric tenderness   Musculoskeletal:         General: Normal range of motion.   Skin:     General: Skin is warm.   Neurological:      General: No focal deficit present.      Mental Status: He is alert.         Fluids    Intake/Output Summary (Last 24 hours) at 4/5/2025 1607  Last data filed at 4/5/2025 0900  Gross per 24 hour   Intake 641.67 ml   Output 451 ml   Net 190.67 ml        Laboratory  Recent Labs     04/03/25  0027 04/03/25  0554 04/04/25  0017 04/04/25  0619 04/05/25  0012 04/05/25  0901 04/05/25  1459   WBC 8.9  --  9.1  --  9.8  --   --    RBC 2.90*  --  2.74*  --  2.12*  --   --    HEMOGLOBIN 9.1*   < > 8.7*   < > 6.6* 8.0* 7.9*   HEMATOCRIT 27.0*   < > 26.3*   < > 21.2* 24.3* 23.6*   MCV 93.1  --  96.0  --  100.0*  --   --    MCH 31.4  --  31.8  --  31.1  --   --    MCHC 33.7  --  33.1  --  31.1*  --   --    RDW 70.2*  --  72.8*  --  76.7*  --   --    PLATELETCT 166  --  215  --  251  --   --    MPV 8.8*  --  9.0  --  9.2  --   --     < > = values in this interval not displayed.     Recent Labs     04/03/25  0027 04/04/25  0017 04/05/25  0012   SODIUM 133* 131* 134*   POTASSIUM 3.1* 3.5* 3.2*   CHLORIDE 105 104 108   CO2 18* 17* 18*   GLUCOSE 91 86 104*   BUN 16 11 17   CREATININE 0.71 0.68 0.74   CALCIUM 7.7* 7.7* 7.6*                     Imaging  DX-CHEST-LIMITED (1 VIEW)   Final Result         1.  Hazy right midlung opacity suggests subtle infiltrate   2.  Trace bilateral pleural effusions   3.  Left midlung pulmonary nodule, location and appearance suggest possible nipple shadow. Could be followed up with repeat chest x-ray with nipple markers to exclude pulmonary nodule.   4.  Atherosclerosis      IR-EMBOLIZATION    (Results Pending)         Assessment/Plan  * UGIB (upper gastrointestinal bleed)- (present on  admission)  Assessment & Plan  Reports using ibuprofen for low back pain  Also reports drinking alcohol and quit drinking couple weeks ago  Patient reports a 1-1 and half week history of melanotic and bright red bloody bowel movements  CT imaging was obtained concerning for perforation of the descending outside of the duodenum.  Case discussed with both general surgery and gastroenterology.    EGD is contraindicated at this time  Patient is now status post 4 units of packed PRBC  Trend hgb   PPI IV BID  Dr. Sea RODAS consulted and patient is now status post. satisfactory coil embo of GDA. NO ACTIVE BLEED PRESENT AT THIS TIME on 4/5  NPO   Avoid NSAIDs  Transfuse for hemoglobin < 7     Duodenal perforation (HCC)  Assessment & Plan  CT imaging:  Findings are concerning for perforation of the descending aspect of the duodenum, and correlation with endoscopy is suggested.   General surgery consulted.  No surgical intervention at this time.  They are not recommending a Darrel patch.    Dr. Sea RODAS consulted and patient is now status post. satisfactory coil embo of GDA. NO ACTIVE BLEED PRESENT AT THIS TIME on 4/5  Antibiotics switched from zosyn to IV unasyn. Blood culture with no growth to date.         Acute blood loss anemia  Assessment & Plan  Patient has received total 4 units packed PRBC from outside facility  Most recent hemoglobin of 9.1  Follow-up repeat CBC  Every 6 hour H&H  Transfuse for hemoglobin less than 7      Falls- (present on admission)  Assessment & Plan  PT/OT evaluation     History of vertebral fracture- (present on admission)  Assessment & Plan  Compression deformity is noted involving the T8 vertebrae, and is favored represent sequela of chronic rather than acute process.     Rib fractures- (present on admission)  Assessment & Plan  Multiple rib fractures are present, variable age. Fractures involving posterior aspects of left ribs may be acute or subacute.  Multimodal pain control     Hypokalemia-  (present on admission)  Assessment & Plan  Replete and monitor          VTE prophylaxis: contraindicated due to GI bleed    I have performed a physical exam and reviewed and updated ROS and Plan today (4/5/2025). In review of yesterday's note (4/4/2025), there are no changes except as documented above.    Patient has a high medical complexity, complex decision making and is at high risk of complication, morbidity, and mortality    Greater than 51 minutes spent prepping to see patient (e.g. review of tests) obtaining and/or reviewing separately obtained history. Performing a medically appropriate examination and/ evaluation.  Counseling and educating the patient/family/caregiver.  Ordering medications, tests, or procedures.  Referring and communicating with other health care professionals.  Documenting clinical information in EPIC.  Independently interpreting results and communicating results to patient/family/caregiver.  Care coordination

## 2025-04-05 NOTE — PROGRESS NOTES
Monitor Summary  Rhythm: SR  Rate: 67-85  Ectopy: R PVC, O PAC  Measurement: .15/.08/.42

## 2025-04-05 NOTE — CARE PLAN
The patient is Watcher - Medium risk of patient condition declining or worsening    Shift Goals  Clinical Goals: patient will remain free from falls, doucment bowel movemtns and monitor labs  Patient Goals: rest  Family Goals: LIZZIE    Progress made toward(s) clinical / shift goals:    Problem: Knowledge Deficit - Standard  Goal: Patient and family/care givers will demonstrate understanding of plan of care, disease process/condition, diagnostic tests and medications  Outcome: Progressing     Problem: Pain - Standard  Goal: Alleviation of pain or a reduction in pain to the patient’s comfort goal  Outcome: Progressing     Problem: Skin Integrity  Goal: Skin integrity is maintained or improved  Outcome: Progressing     Problem: Fall Risk  Goal: Patient will remain free from falls  Outcome: Progressing       Patient is not progressing towards the following goals:

## 2025-04-06 LAB
ALBUMIN SERPL BCP-MCNC: 2.5 G/DL (ref 3.2–4.9)
ALBUMIN/GLOB SERPL: 1.5 G/DL
ALP SERPL-CCNC: 60 U/L (ref 30–99)
ALT SERPL-CCNC: 6 U/L (ref 2–50)
ANION GAP SERPL CALC-SCNC: 7 MMOL/L (ref 7–16)
AST SERPL-CCNC: 16 U/L (ref 12–45)
BILIRUB SERPL-MCNC: 0.4 MG/DL (ref 0.1–1.5)
BUN SERPL-MCNC: 6 MG/DL (ref 8–22)
CALCIUM ALBUM COR SERPL-MCNC: 8.8 MG/DL (ref 8.5–10.5)
CALCIUM SERPL-MCNC: 7.6 MG/DL (ref 8.5–10.5)
CHLORIDE SERPL-SCNC: 110 MMOL/L (ref 96–112)
CO2 SERPL-SCNC: 19 MMOL/L (ref 20–33)
CREAT SERPL-MCNC: 0.67 MG/DL (ref 0.5–1.4)
ERYTHROCYTE [DISTWIDTH] IN BLOOD BY AUTOMATED COUNT: 67.8 FL (ref 35.9–50)
GFR SERPLBLD CREATININE-BSD FMLA CKD-EPI: 101 ML/MIN/1.73 M 2
GLOBULIN SER CALC-MCNC: 1.7 G/DL (ref 1.9–3.5)
GLUCOSE SERPL-MCNC: 88 MG/DL (ref 65–99)
HCT VFR BLD AUTO: 22 % (ref 42–52)
HCT VFR BLD AUTO: 22.2 % (ref 42–52)
HCT VFR BLD AUTO: 23.7 % (ref 42–52)
HCT VFR BLD AUTO: 24 % (ref 42–52)
HGB BLD-MCNC: 7.3 G/DL (ref 14–18)
HGB BLD-MCNC: 7.5 G/DL (ref 14–18)
HGB BLD-MCNC: 7.8 G/DL (ref 14–18)
HGB BLD-MCNC: 7.8 G/DL (ref 14–18)
MCH RBC QN AUTO: 31.6 PG (ref 27–33)
MCHC RBC AUTO-ENTMCNC: 33.8 G/DL (ref 32.3–36.5)
MCV RBC AUTO: 93.7 FL (ref 81.4–97.8)
PLATELET # BLD AUTO: 237 K/UL (ref 164–446)
PMV BLD AUTO: 9.2 FL (ref 9–12.9)
POTASSIUM SERPL-SCNC: 3.7 MMOL/L (ref 3.6–5.5)
PROT SERPL-MCNC: 4.2 G/DL (ref 6–8.2)
RBC # BLD AUTO: 2.37 M/UL (ref 4.7–6.1)
SODIUM SERPL-SCNC: 136 MMOL/L (ref 135–145)
WBC # BLD AUTO: 9.3 K/UL (ref 4.8–10.8)

## 2025-04-06 PROCEDURE — 700111 HCHG RX REV CODE 636 W/ 250 OVERRIDE (IP): Mod: JZ | Performed by: STUDENT IN AN ORGANIZED HEALTH CARE EDUCATION/TRAINING PROGRAM

## 2025-04-06 PROCEDURE — 700105 HCHG RX REV CODE 258: Performed by: STUDENT IN AN ORGANIZED HEALTH CARE EDUCATION/TRAINING PROGRAM

## 2025-04-06 PROCEDURE — 770020 HCHG ROOM/CARE - TELE (206)

## 2025-04-06 PROCEDURE — A9270 NON-COVERED ITEM OR SERVICE: HCPCS | Performed by: STUDENT IN AN ORGANIZED HEALTH CARE EDUCATION/TRAINING PROGRAM

## 2025-04-06 PROCEDURE — 700102 HCHG RX REV CODE 250 W/ 637 OVERRIDE(OP): Performed by: STUDENT IN AN ORGANIZED HEALTH CARE EDUCATION/TRAINING PROGRAM

## 2025-04-06 PROCEDURE — 36415 COLL VENOUS BLD VENIPUNCTURE: CPT

## 2025-04-06 PROCEDURE — 80053 COMPREHEN METABOLIC PANEL: CPT

## 2025-04-06 PROCEDURE — 99233 SBSQ HOSP IP/OBS HIGH 50: CPT | Performed by: STUDENT IN AN ORGANIZED HEALTH CARE EDUCATION/TRAINING PROGRAM

## 2025-04-06 PROCEDURE — 85018 HEMOGLOBIN: CPT

## 2025-04-06 PROCEDURE — 85014 HEMATOCRIT: CPT

## 2025-04-06 PROCEDURE — 85027 COMPLETE CBC AUTOMATED: CPT

## 2025-04-06 PROCEDURE — 700111 HCHG RX REV CODE 636 W/ 250 OVERRIDE (IP): Performed by: STUDENT IN AN ORGANIZED HEALTH CARE EDUCATION/TRAINING PROGRAM

## 2025-04-06 PROCEDURE — 99232 SBSQ HOSP IP/OBS MODERATE 35: CPT

## 2025-04-06 RX ORDER — HYDROMORPHONE HYDROCHLORIDE 1 MG/ML
0.5 INJECTION, SOLUTION INTRAMUSCULAR; INTRAVENOUS; SUBCUTANEOUS EVERY 4 HOURS PRN
Status: DISCONTINUED | OUTPATIENT
Start: 2025-04-06 | End: 2025-04-08

## 2025-04-06 RX ORDER — OXYCODONE HYDROCHLORIDE 5 MG/1
5 TABLET ORAL EVERY 4 HOURS PRN
Refills: 0 | Status: DISCONTINUED | OUTPATIENT
Start: 2025-04-06 | End: 2025-04-08

## 2025-04-06 RX ADMIN — OXYCODONE HYDROCHLORIDE 5 MG: 5 TABLET ORAL at 17:25

## 2025-04-06 RX ADMIN — PANTOPRAZOLE SODIUM 40 MG: 40 INJECTION, POWDER, FOR SOLUTION INTRAVENOUS at 17:26

## 2025-04-06 RX ADMIN — OXYCODONE HYDROCHLORIDE 5 MG: 5 TABLET ORAL at 02:15

## 2025-04-06 RX ADMIN — HYDROMORPHONE HYDROCHLORIDE 0.5 MG: 1 INJECTION, SOLUTION INTRAMUSCULAR; INTRAVENOUS; SUBCUTANEOUS at 13:01

## 2025-04-06 RX ADMIN — OXYCODONE HYDROCHLORIDE 5 MG: 5 TABLET ORAL at 10:47

## 2025-04-06 RX ADMIN — ACETAMINOPHEN 650 MG: 325 TABLET ORAL at 00:38

## 2025-04-06 RX ADMIN — PANTOPRAZOLE SODIUM 40 MG: 40 INJECTION, POWDER, FOR SOLUTION INTRAVENOUS at 06:42

## 2025-04-06 RX ADMIN — OXYCODONE HYDROCHLORIDE 5 MG: 5 TABLET ORAL at 06:44

## 2025-04-06 RX ADMIN — AMPICILLIN AND SULBACTAM 3 G: 1; 2 INJECTION, POWDER, FOR SOLUTION INTRAMUSCULAR; INTRAVENOUS at 06:41

## 2025-04-06 RX ADMIN — HYDROMORPHONE HYDROCHLORIDE 0.5 MG: 1 INJECTION, SOLUTION INTRAMUSCULAR; INTRAVENOUS; SUBCUTANEOUS at 20:21

## 2025-04-06 RX ADMIN — ACETAMINOPHEN 650 MG: 325 TABLET ORAL at 08:54

## 2025-04-06 RX ADMIN — AMPICILLIN AND SULBACTAM 3 G: 1; 2 INJECTION, POWDER, FOR SOLUTION INTRAMUSCULAR; INTRAVENOUS at 12:05

## 2025-04-06 RX ADMIN — AMPICILLIN AND SULBACTAM 3 G: 1; 2 INJECTION, POWDER, FOR SOLUTION INTRAMUSCULAR; INTRAVENOUS at 00:37

## 2025-04-06 RX ADMIN — AMPICILLIN AND SULBACTAM 3 G: 1; 2 INJECTION, POWDER, FOR SOLUTION INTRAMUSCULAR; INTRAVENOUS at 17:29

## 2025-04-06 ASSESSMENT — ENCOUNTER SYMPTOMS
MYALGIAS: 1
SENSORY CHANGE: 0
CHILLS: 1
SPUTUM PRODUCTION: 0
DIZZINESS: 0
CHILLS: 0
FEVER: 0
DEPRESSION: 0
FOCAL WEAKNESS: 0
BACK PAIN: 1
WEAKNESS: 1
COUGH: 0
ABDOMINAL PAIN: 1
DIZZINESS: 1
BLOOD IN STOOL: 1
VOMITING: 0
NAUSEA: 1

## 2025-04-06 ASSESSMENT — PAIN DESCRIPTION - PAIN TYPE
TYPE: ACUTE PAIN

## 2025-04-06 ASSESSMENT — LIFESTYLE VARIABLES: SUBSTANCE_ABUSE: 1

## 2025-04-06 ASSESSMENT — FIBROSIS 4 INDEX: FIB4 SCORE: 1.9

## 2025-04-06 NOTE — CARE PLAN
The patient is Watcher - Medium risk of patient condition declining or worsening    Shift Goals  Clinical Goals: Hemodynamic stability, IR< embolization  Patient Goals: Feel better  Family Goals: LIZZIE    Progress made toward(s) clinical / shift goals:  Progressing      Problem: Knowledge Deficit - Standard  Goal: Patient and family/care givers will demonstrate understanding of plan of care, disease process/condition, diagnostic tests and medications  Outcome: Progressing  Note: Pt verbalizes understanding of plan of care     Problem: Pain - Standard  Goal: Alleviation of pain or a reduction in pain to the patient’s comfort goal  Outcome: Progressing  Note: Pt complaining of abdominal pain 7-8/10. Medicated per MAR

## 2025-04-06 NOTE — PROGRESS NOTES
"  DATE: 4/6/2025    Hospital Day 4  duodenal ulcer .    INTERVAL EVENTS:  S/p coil embo of GDA yesterday with no active bleed present  Hgb stable 7.8 (7.3)  No leukocytosis   Ongoing bloody BM, becoming less frequent   Tolerating clears     REVIEW OF SYSTEMS:  Comprehensive review of systems is negative with the exception of the aforementioned HPI, PMH, and PSH bullets in accordance with CMS guidelines.    PHYSICAL EXAMINATION:  Vital Signs: /65   Pulse 91   Temp 36.6 °C (97.9 °F) (Temporal)   Resp 18   Ht 1.727 m (5' 8\")   Wt 65 kg (143 lb 4.8 oz)   SpO2 95%   Physical Exam  Vitals and nursing note reviewed.   Constitutional:       General: He is not in acute distress.     Appearance: Normal appearance. He is not ill-appearing.   HENT:      Head: Normocephalic.   Eyes:      General:         Right eye: No discharge.         Left eye: No discharge.   Pulmonary:      Effort: Pulmonary effort is normal. No respiratory distress.   Abdominal:      General: Abdomen is flat. There is no distension.      Palpations: Abdomen is soft.      Tenderness: There is abdominal tenderness. There is no guarding.      Comments: Minimal epigastric tenderness   Skin:     General: Skin is warm and dry.      Capillary Refill: Capillary refill takes less than 2 seconds.   Neurological:      General: No focal deficit present.      Mental Status: He is alert and oriented to person, place, and time.   Psychiatric:         Mood and Affect: Mood normal.         Behavior: Behavior normal.         LABORATORY VALUES:  Recent Labs     04/04/25  0017 04/04/25  0619 04/05/25  0012 04/05/25  0901 04/05/25  2117 04/06/25  0107 04/06/25  0620   WBC 9.1  --  9.8  --   --  9.3  --    RBC 2.74*  --  2.12*  --   --  2.37*  --    HEMOGLOBIN 8.7*   < > 6.6*   < > 7.9* 7.3*  7.5* 7.8*   HEMATOCRIT 26.3*   < > 21.2*   < > 23.1* 22.0*  22.2* 23.7*   MCV 96.0  --  100.0*  --   --  93.7  --    MCH 31.8  --  31.1  --   --  31.6  --    [facility] 33.1  --  " 31.1*  --   --  33.8  --    RDW 72.8*  --  76.7*  --   --  67.8*  --    PLATELETCT 215  --  251  --   --  237  --    MPV 9.0  --  9.2  --   --  9.2  --     < > = values in this interval not displayed.     Recent Labs     04/04/25 0017 04/05/25 0012 04/06/25  0107   SODIUM 131* 134* 136   POTASSIUM 3.5* 3.2* 3.7   CHLORIDE 104 108 110   CO2 17* 18* 19*   GLUCOSE 86 104* 88   BUN 11 17 6*   CREATININE 0.68 0.74 0.67   CALCIUM 7.7* 7.6* 7.6*     Recent Labs     04/04/25 0017 04/05/25 0012 04/06/25  0107   ASTSGOT 24 18 16   ALTSGPT 9 6 6   TBILIRUBIN 0.5 0.3 0.4   ALKPHOSPHAT 74 63 60   GLOBULIN 2.0 1.7* 1.7*           IMAGING:  IR-EMBOLIZATION   Final Result      1.  No active extravasation, pseudoaneurysm, or injury or aberrant vessel identified involving the GDA or branches in the vicinity of the duodenal bleeding site.   2.  Empirical coil embolization performed as planned with satisfactory coil occlusion of the GDA. Note, upon removal of the microcatheter, the final coil prolapsed upward into the middle hepatic artery without flow compromise or occlusion. This was left    in place. Satisfactory coil occlusion of the GDA and side branches.   3.  Unremarkable SMA angiography.   4.  Right common femoral 6 Serbian Angio-Seal placement                  INTERPRETING LOCATION: [address] [place] [address], [id number]      DX-CHEST-LIMITED (1 VIEW)   Final Result         1.  Hazy right midlung opacity suggests subtle infiltrate   2.  Trace bilateral pleural effusions   3.  Left midlung pulmonary nodule, location and appearance suggest possible nipple shadow. Could be followed up with repeat chest x-ray with nipple markers to exclude pulmonary nodule.   4.  Atherosclerosis          ASSESSMENT AND PLAN:    Duodenal ulcer:    - Advance to full liquid diet  - Continue IV antibiotics  - Continue PPI  - Mobilize        ____________________________________     Lea Monroy P.A.-C.    DD: 4/6/2025  8:22 AM

## 2025-04-06 NOTE — PROGRESS NOTES
Bedside report received from off going RN/tech: Anabel, assumed care of patient.     Fall Risk Score: HIGH RISK  Fall risk interventions in place: Place yellow fall risk ID band on patient, Provide patient/family education based on risk assessment, Educate patient/family to call staff for assistance when getting out of bed, Place fall precaution signage outside patient door, Place patient in room close to nursing station, Utilize bed/chair fall alarm, Notify charge of high risk for huddle, and Bed alarm connected correctly  Bed type: Regular (Umair Score less than 17 interventions in place)  Patient on cardiac monitor: Yes  IVF/IV medications: Not Applicable   Oxygen: Room Air  Bedside sitter: Not Applicable   Isolation: Not applicable

## 2025-04-06 NOTE — PROGRESS NOTES
Hospital Medicine Daily Progress Note    Date of Service  4/6/2025    Chief Complaint  Sam Potts is a 70 y.o. male admitted 4/2/2025 with UGIB and perforation     Hospital Course  No notes on file    Sam Potts is a 70 y.o. male who presented 4/2/2025 as a transfer positive facility for higher level of care and interventional radiology evaluation.      Patient was being evaluated at outside facility after having ongoing black tarry schools and bright red bloody bowel movements over the last week to week and a half.  Patient does endorse some generalized abdominal pain as well.      CT of the abdomen was performed and was concerning for perforation of the descending aspect of the duodenum.  Both general surgery and gastroenterology service consulted on the patient.  EGD is contraindicated at this time.  General surgery did not feel like a Darrel patch was appropriate, and recommended that the patient be seen by interventional radiology for embolization.  Of note, the patient was found to be severely anemic.  He received a total of 4 units packed PRBC.  Hemoglobin currently stable at 9.1.     Dr. Osei IR consulted and patient is now status post. satisfactory coil embo of GDA on 4/5 with no active bleeding noticed.           Interval Problem Update  4/3:  Seen and evaluated patient at the bedside. Patient reports using ibuprophen for low back pain. Also reports drinking alcohol. Quit couple weeks ago. Reports 1 small melanotic stool this morning. Denies chest pain. Denies nausea or vomiting. On RA. /83, pulse 74.   Hgb 9.3  CMP showed Na of 133, K 3.1, Co2 18, calcium 7.7  Received IV calcium gluconate   On IV potassium 10 meq   On IV PPI  On IV zosyn  Blood culture with no growth to date  IR evaluated patient and recommended conservative management   Trend hgb   Repeat CBC in am to monitor hgb level   Repeat CMP in am to monitor renal function and electrolyte imbalance.    4/4:  Seen and evaluated pt at  the bedside. Denies nausea and vomiting. Reports he is very hungry. Started on clear liquid diet.   Hgb 8.9  Reports mild epigastric pain   Continue to have melena but patient stated it is improving   He went to the bathroom this morning and reports felt dizzy and was found to have low BP. Will administer 250 ns bolus and recheck hgb level.   K is 3.5 and 20 meq IV K is ordered   Magnesium 1.8  Trend hgb   If brisk bleeding is noted, reconsult IR for possible embolization     4/5:  Seen and evaluated patient at the bedside. Denies fever or chills. Reports abdominal discomfort. Had 7 bloody bowel movement since yesterday. Drop  in hgb to 6.6 and 1 unit of PRBC administered.   Dr. Osei IR consulted and patient is now status post. satisfactory coil embo of GDA. NO ACTIVE BLEED PRESENT AT THIS TIME  Trend hgb   Antibiotics switched from zosyn to IV unasyn  Blood culture no growth to date    4/6:  I have seen and evaluated patient at the bedside. Complaining of back pain, rib pain, and abdominal pain. Reports continue to have melena. Frustrated that why he cannot eat a regular meal. Discussed with patient that he has duodenal ulcer with perforation and trying to advancing his diet slowly.  Hgb 7.8  On IV unasyn with blood culture no growth to date.   Diet advanced to full liquid  Repeat CBC in am           I have discussed this patient's plan of care and discharge plan at IDT rounds today with Case Management, Nursing, Nursing leadership, and other members of the IDT team.    Consultants/Specialty  General surgery   GI   IR     Code Status  Full Code    Disposition  Not Medically Cleared  I have placed the appropriate orders for post-discharge needs.    Review of Systems  Review of Systems   Constitutional:  Negative for chills, fever and malaise/fatigue.   Respiratory:  Negative for cough.    Cardiovascular:  Negative for chest pain and leg swelling.   Gastrointestinal:  Positive for abdominal pain and blood in stool.    Neurological:  Positive for weakness. Negative for dizziness.        Physical Exam  Temp:  [36.6 °C (97.9 °F)-37.1 °C (98.8 °F)] 36.6 °C (97.9 °F)  Pulse:  [75-91] 91  Resp:  [12-18] 18  BP: ()/(8-81) 108/65  SpO2:  [93 %-100 %] 95 %    Physical Exam  Constitutional:       Appearance: Normal appearance.   HENT:      Head: Normocephalic.      Mouth/Throat:      Mouth: Mucous membranes are moist.   Cardiovascular:      Rate and Rhythm: Normal rate.   Pulmonary:      Effort: Pulmonary effort is normal.   Abdominal:      General: Abdomen is flat.      Palpations: Abdomen is soft.      Tenderness: There is abdominal tenderness.      Comments: Epigastric tenderness   Musculoskeletal:         General: Normal range of motion.   Skin:     General: Skin is warm.   Neurological:      General: No focal deficit present.      Mental Status: He is alert.         Fluids    Intake/Output Summary (Last 24 hours) at 4/6/2025 1107  Last data filed at 4/6/2025 0711  Gross per 24 hour   Intake 640 ml   Output 750 ml   Net -110 ml        Laboratory  Recent Labs     04/04/25  0017 04/04/25  0619 04/05/25  0012 04/05/25  0901 04/05/25  2117 04/06/25  0107 04/06/25  0620   WBC 9.1  --  9.8  --   --  9.3  --    RBC 2.74*  --  2.12*  --   --  2.37*  --    HEMOGLOBIN 8.7*   < > 6.6*   < > 7.9* 7.3*  7.5* 7.8*   HEMATOCRIT 26.3*   < > 21.2*   < > 23.1* 22.0*  22.2* 23.7*   MCV 96.0  --  100.0*  --   --  93.7  --    MCH 31.8  --  31.1  --   --  31.6  --    MCHC 33.1  --  31.1*  --   --  33.8  --    RDW 72.8*  --  76.7*  --   --  67.8*  --    PLATELETCT 215  --  251  --   --  237  --    MPV 9.0  --  9.2  --   --  9.2  --     < > = values in this interval not displayed.     Recent Labs     04/04/25  0017 04/05/25  0012 04/06/25  0107   SODIUM 131* 134* 136   POTASSIUM 3.5* 3.2* 3.7   CHLORIDE 104 108 110   CO2 17* 18* 19*   GLUCOSE 86 104* 88   BUN 11 17 6*   CREATININE 0.68 0.74 0.67   CALCIUM 7.7* 7.6* 7.6*                      Imaging  IR-EMBOLIZATION   Final Result      1.  No active extravasation, pseudoaneurysm, or injury or aberrant vessel identified involving the GDA or branches in the vicinity of the duodenal bleeding site.   2.  Empirical coil embolization performed as planned with satisfactory coil occlusion of the GDA. Note, upon removal of the microcatheter, the final coil prolapsed upward into the middle hepatic artery without flow compromise or occlusion. This was left    in place. Satisfactory coil occlusion of the GDA and side branches.   3.  Unremarkable SMA angiography.   4.  Right common femoral 6 Indonesian Angio-Seal placement                  INTERPRETING LOCATION: [address], [place], [address]      DX-CHEST-LIMITED (1 VIEW)   Final Result         1.  Hazy right midlung opacity suggests subtle infiltrate   2.  Trace bilateral pleural effusions   3.  Left midlung pulmonary nodule, location and appearance suggest possible nipple shadow. Could be followed up with repeat chest x-ray with nipple markers to exclude pulmonary nodule.   4.  Atherosclerosis            Assessment/Plan  * UGIB (upper gastrointestinal bleed)- (present on admission)  Assessment & Plan  Reports using ibuprofen for low back pain  Also reports drinking alcohol and quit drinking couple weeks ago  Patient reports a 1-1 and half week history of melanotic and bright red bloody bowel movements  CT imaging was obtained concerning for perforation of the descending outside of the duodenum.  Case discussed with both general surgery and gastroenterology.    EGD is contraindicated at this time  Patient is now status post 4 units of packed PRBC  Trend hgb   PPI IV BID  Dr. Osei IR consulted and patient is now status post. satisfactory coil embo of GDA. NO ACTIVE BLEED PRESENT AT THIS TIME on 4/5  NPO   Avoid NSAIDs  Transfuse for hemoglobin < 7     Duodenal perforation (HCC)  Assessment & Plan  CT imaging:  Findings are concerning for perforation of the descending  aspect of the duodenum, and correlation with endoscopy is suggested.   General surgery consulted.  No surgical intervention at this time.  They are not recommending a Darrel patch.    Dr. Sea RODAS consulted and patient is now status post. satisfactory coil embo of GDA. NO ACTIVE BLEED PRESENT AT THIS TIME on 4/5  Antibiotics switched from zosyn to IV unasyn. Blood culture with no growth to date.         Acute blood loss anemia  Assessment & Plan  Patient has received total 4 units packed PRBC from outside facility  Most recent hemoglobin of 9.1  Follow-up repeat CBC  Every 6 hour H&H  Transfuse for hemoglobin less than 7      Falls- (present on admission)  Assessment & Plan  PT/OT evaluation     History of vertebral fracture- (present on admission)  Assessment & Plan  Compression deformity is noted involving the T8 vertebrae, and is favored represent sequela of chronic rather than acute process.     Rib fractures- (present on admission)  Assessment & Plan  Multiple rib fractures are present, variable age. Fractures involving posterior aspects of left ribs may be acute or subacute.  Multimodal pain control     Hypokalemia- (present on admission)  Assessment & Plan  Replete and monitor          VTE prophylaxis: contraindicated due to GI bleed    I have performed a physical exam and reviewed and updated ROS and Plan today (4/6/2025). In review of yesterday's note (4/5/2025), there are no changes except as documented above.    Patient has a high medical complexity, complex decision making and is at high risk of complication, morbidity, and mortality    Greater than 51 minutes spent prepping to see patient (e.g. review of tests) obtaining and/or reviewing separately obtained history. Performing a medically appropriate examination and/ evaluation.  Counseling and educating the patient/family/caregiver.  Ordering medications, tests, or procedures.  Referring and communicating with other health care professionals.   Documenting clinical information in EPIC.  Independently interpreting results and communicating results to patient/family/caregiver.  Care coordination

## 2025-04-06 NOTE — PROGRESS NOTES
"Radiology Progress Note     Author: Erin Ya D.N.P. Date & Time created: 4/6/2025  8:41 AM   Date of admission  4/2/2025  Note to reader: this note follows the APSO format rather than the historical SOAP format. Assessment and plan located at the top of the note for ease of use.    Chief Complaint  70 y.o. male admitted 4/2/2025 with GI Bleed    HPI  Kb Potts is a 70-year-old male with PMH significant for recent melena s/p EGD (3/5/2024) and colonoscopy (3/6/2024) HTN, BPH, GI AVM, gastric erosions, ulcers, and duodenitis, and alcohol use disorder who presented with chest pain, dyspnea, and epigastric pain after 1 week of black tarry stools, nausea, and vomiting without hematemesis.  4/1/2025 CTA demonstrates \"findings concerning for perforation of the descending aspect of the duodenum\".  4/5/2025 IR Dr. Osei performed an empirical coil embolization with satisfactory coil occlusion of the GDA for a perforated duodenal ulcer with ongoing bleeding requiring blood transfusions, but without active extravasation, pseudoaneurysm or GDA injury.    Interval History IR  4/6/2025: Right femoral angio site is mildly tender without ecchymosis, oozing, or hematoma.  Right groin site dressing is clean, dry, and intact.  Bilateral dorsalis pedis pulses +2, feet warm and without edema.  Patient is conscious and alert, reclining in hospital bed.  He describes the chest pain and low back pain and areas of fractures, left shoulder discomfort, and moderate RLQ abdominal pain.  He endorses continued black tarry bowel movements, although BM volume has decreased over the last 24 hours.  He continues to have mild nausea but is without vomiting. I reviewed patient's most recent labs including WBC 9.3, Hgb 7.8<7.3, CR 0.67.  Coordinated post IR procedure care with patient, interventional radiologist, and hospital nursing staff.    Assessment/Plan     Principal Problem:    UGIB (upper gastrointestinal bleed)  Active Problems:    " Hypokalemia    Rib fractures    History of vertebral fracture    Falls    Acute blood loss anemia    Duodenal perforation (HCC)      Plan IR  -Post IR groin access site instructions:   - no lifting greater than 5 lbs for 7 days  - no baths/swimming/soaking in tub for 7 days. Shower OK.   - OK to change dressings/band aid as needed.    -Thank you for allowing Interventional Radiology team to participate in the patients care, if any additional care or requests are needed in the future please do not hesitate call or place IR order             Review of Systems  Physical Exam   Review of Systems   Constitutional:  Positive for chills. Negative for fever.   Respiratory:  Negative for cough and sputum production.    Cardiovascular:  Positive for chest pain (Pain from rib fractures). Negative for leg swelling.   Gastrointestinal:  Positive for abdominal pain (RLQ), blood in stool, melena and nausea. Negative for vomiting.   Musculoskeletal:  Positive for back pain (Low back), joint pain (Left shoulder pain) and myalgias.   Neurological:  Positive for dizziness (When he stands). Negative for sensory change and focal weakness.   Psychiatric/Behavioral:  Positive for substance abuse (EtOH). Negative for depression.       Vitals:    04/06/25 0759   BP: 108/65   Pulse: 91   Resp: 18   Temp: 36.6 °C (97.9 °F)   SpO2: 95%        Physical Exam  Vitals and nursing note reviewed.   Constitutional:       General: He is not in acute distress.     Appearance: He is ill-appearing.   Cardiovascular:      Rate and Rhythm: Normal rate.      Pulses: Normal pulses.   Pulmonary:      Effort: No respiratory distress.   Abdominal:      General: There is no distension.      Tenderness: There is abdominal tenderness (RLQ).   Musculoskeletal:      Right lower leg: No edema.      Left lower leg: No edema.   Skin:     General: Skin is warm and dry.      Coloration: Skin is pale.   Neurological:      General: No focal deficit present.      Mental  Status: He is alert and oriented to person, place, and time.      Sensory: No sensory deficit.   Psychiatric:         Mood and Affect: Mood normal.         Behavior: Behavior normal.             Labs    Recent Labs     04/04/25  0017 04/04/25  0619 04/05/25  0012 04/05/25  0901 04/05/25  2117 04/06/25  0107 04/06/25  0620   WBC 9.1  --  9.8  --   --  9.3  --    RBC 2.74*  --  2.12*  --   --  2.37*  --    HEMOGLOBIN 8.7*   < > 6.6*   < > 7.9* 7.3*  7.5* 7.8*   HEMATOCRIT 26.3*   < > 21.2*   < > 23.1* 22.0*  22.2* 23.7*   MCV 96.0  --  100.0*  --   --  93.7  --    MCH 31.8  --  31.1  --   --  31.6  --    MCHC 33.1  --  31.1*  --   --  33.8  --    RDW 72.8*  --  76.7*  --   --  67.8*  --    PLATELETCT 215  --  251  --   --  237  --    MPV 9.0  --  9.2  --   --  9.2  --     < > = values in this interval not displayed.     Recent Labs     04/04/25 0017 04/05/25  0012 04/06/25  0107   SODIUM 131* 134* 136   POTASSIUM 3.5* 3.2* 3.7   CHLORIDE 104 108 110   CO2 17* 18* 19*   GLUCOSE 86 104* 88   BUN 11 17 6*   CREATININE 0.68 0.74 0.67   CALCIUM 7.7* 7.6* 7.6*     Recent Labs     04/04/25 0017 04/05/25  0012 04/06/25  0107   ALBUMIN 2.7* 2.7* 2.5*   TBILIRUBIN 0.5 0.3 0.4   ALKPHOSPHAT 74 63 60   TOTPROTEIN 4.7* 4.4* 4.2*   ALTSGPT 9 6 6   ASTSGOT 24 18 16   CREATININE 0.68 0.74 0.67     IR-EMBOLIZATION   Final Result      1.  No active extravasation, pseudoaneurysm, or injury or aberrant vessel identified involving the GDA or branches in the vicinity of the duodenal bleeding site.   2.  Empirical coil embolization performed as planned with satisfactory coil occlusion of the GDA. Note, upon removal of the microcatheter, the final coil prolapsed upward into the middle hepatic artery without flow compromise or occlusion. This was left    in place. Satisfactory coil occlusion of the GDA and side branches.   3.  Unremarkable SMA angiography.   4.  Right common femoral 6 Japanese Angio-Seal placement                 "  INTERPRETING LOCATION: [address] [place] [address], [id number]      DX-CHEST-LIMITED (1 VIEW)   Final Result         1.  Hazy right midlung opacity suggests subtle infiltrate   2.  Trace bilateral pleural effusions   3.  Left midlung pulmonary nodule, location and appearance suggest possible nipple shadow. Could be followed up with repeat chest x-ray with nipple markers to exclude pulmonary nodule.   4.  Atherosclerosis          INR   Date Value Ref Range Status   04/01/2025 1.16  Final     Comment:     INR Reference range dependent on prevention therapy:  Normal Range:               0.9 - 1.1  Moderate Warfarin therapy:  2.0 - 3.0  High Warfarin therapy:      2.5 - 3.5  -------------------------------------------------------------------------       No results found for: \"POCINR\"     Intake/Output Summary (Last 24 hours) at 4/6/2025 0841  Last data filed at 4/6/2025 0711  Gross per 24 hour   Intake 640 ml   Output 900 ml   Net -260 ml      Labs not explicitly included in this progress note were reviewed by the author. Radiology/imaging not explicitly included in this progress note was reviewed by the author.     I have performed a physical exam and reviewed and updated ROS and Plan today (4/6/2025).     55 minutes in directly providing and coordinating care and extensive data review.  No time overlap and excludes procedures.  "

## 2025-04-06 NOTE — CARE PLAN
Problem: Knowledge Deficit - Standard  Goal: Patient and family/care givers will demonstrate understanding of plan of care, disease process/condition, diagnostic tests and medications  Outcome: Progressing     Problem: Pain - Standard  Goal: Alleviation of pain or a reduction in pain to the patient’s comfort goal  Outcome: Progressing  Note: Assess and monitor for pain. Provide pharmacological and non-pharmacological interventions as appropriate. Re-evaluate and continue to monitor.        Problem: Skin Integrity  Goal: Skin integrity is maintained or improved  Outcome: Progressing     Problem: Fall Risk  Goal: Patient will remain free from falls  Outcome: Not Met  Note: Patient refusing bed alarm. Pt educated on importance. Charge RN notified.    The patient is Watcher - Medium risk of patient condition declining or worsening    Shift Goals  Clinical Goals: monitor labs  Patient Goals: less pain  Family Goals: not present    Progress made toward(s) clinical / shift goals:  has not had a dark BM today    Patient is not progressing towards the following goals:      Problem: Fall Risk  Goal: Patient will remain free from falls  Outcome: Not Met  Note: Patient refusing bed alarm. Pt educated on importance. Charge RN notified.

## 2025-04-07 ENCOUNTER — APPOINTMENT (OUTPATIENT)
Dept: RADIOLOGY | Facility: MEDICAL CENTER | Age: 70
DRG: 357 | End: 2025-04-07
Attending: STUDENT IN AN ORGANIZED HEALTH CARE EDUCATION/TRAINING PROGRAM
Payer: MEDICARE

## 2025-04-07 LAB
ALBUMIN SERPL BCP-MCNC: 2.7 G/DL (ref 3.2–4.9)
ALBUMIN/GLOB SERPL: 1.4 G/DL
ALP SERPL-CCNC: 70 U/L (ref 30–99)
ALT SERPL-CCNC: 6 U/L (ref 2–50)
ANION GAP SERPL CALC-SCNC: 9 MMOL/L (ref 7–16)
AST SERPL-CCNC: 20 U/L (ref 12–45)
BACTERIA BLD CULT: NORMAL
BACTERIA BLD CULT: NORMAL
BILIRUB SERPL-MCNC: 0.4 MG/DL (ref 0.1–1.5)
BUN SERPL-MCNC: 4 MG/DL (ref 8–22)
CALCIUM ALBUM COR SERPL-MCNC: 8.8 MG/DL (ref 8.5–10.5)
CALCIUM SERPL-MCNC: 7.8 MG/DL (ref 8.5–10.5)
CHLORIDE SERPL-SCNC: 106 MMOL/L (ref 96–112)
CO2 SERPL-SCNC: 20 MMOL/L (ref 20–33)
CREAT SERPL-MCNC: 0.65 MG/DL (ref 0.5–1.4)
ERYTHROCYTE [DISTWIDTH] IN BLOOD BY AUTOMATED COUNT: 65.1 FL (ref 35.9–50)
GFR SERPLBLD CREATININE-BSD FMLA CKD-EPI: 102 ML/MIN/1.73 M 2
GLOBULIN SER CALC-MCNC: 2 G/DL (ref 1.9–3.5)
GLUCOSE SERPL-MCNC: 99 MG/DL (ref 65–99)
HCT VFR BLD AUTO: 23.3 % (ref 42–52)
HGB BLD-MCNC: 7.6 G/DL (ref 14–18)
MCH RBC QN AUTO: 31.1 PG (ref 27–33)
MCHC RBC AUTO-ENTMCNC: 32.6 G/DL (ref 32.3–36.5)
MCV RBC AUTO: 95.5 FL (ref 81.4–97.8)
PLATELET # BLD AUTO: 315 K/UL (ref 164–446)
PMV BLD AUTO: 9.1 FL (ref 9–12.9)
POTASSIUM SERPL-SCNC: 3.6 MMOL/L (ref 3.6–5.5)
PROT SERPL-MCNC: 4.7 G/DL (ref 6–8.2)
RBC # BLD AUTO: 2.44 M/UL (ref 4.7–6.1)
SIGNIFICANT IND 70042: NORMAL
SIGNIFICANT IND 70042: NORMAL
SITE SITE: NORMAL
SITE SITE: NORMAL
SODIUM SERPL-SCNC: 135 MMOL/L (ref 135–145)
SOURCE SOURCE: NORMAL
SOURCE SOURCE: NORMAL
WBC # BLD AUTO: 8.6 K/UL (ref 4.8–10.8)

## 2025-04-07 PROCEDURE — A9270 NON-COVERED ITEM OR SERVICE: HCPCS | Performed by: STUDENT IN AN ORGANIZED HEALTH CARE EDUCATION/TRAINING PROGRAM

## 2025-04-07 PROCEDURE — 36415 COLL VENOUS BLD VENIPUNCTURE: CPT

## 2025-04-07 PROCEDURE — 99233 SBSQ HOSP IP/OBS HIGH 50: CPT | Performed by: STUDENT IN AN ORGANIZED HEALTH CARE EDUCATION/TRAINING PROGRAM

## 2025-04-07 PROCEDURE — 700111 HCHG RX REV CODE 636 W/ 250 OVERRIDE (IP): Mod: JZ | Performed by: STUDENT IN AN ORGANIZED HEALTH CARE EDUCATION/TRAINING PROGRAM

## 2025-04-07 PROCEDURE — 85027 COMPLETE CBC AUTOMATED: CPT

## 2025-04-07 PROCEDURE — 80053 COMPREHEN METABOLIC PANEL: CPT

## 2025-04-07 PROCEDURE — 700105 HCHG RX REV CODE 258: Performed by: STUDENT IN AN ORGANIZED HEALTH CARE EDUCATION/TRAINING PROGRAM

## 2025-04-07 PROCEDURE — 700111 HCHG RX REV CODE 636 W/ 250 OVERRIDE (IP): Performed by: STUDENT IN AN ORGANIZED HEALTH CARE EDUCATION/TRAINING PROGRAM

## 2025-04-07 PROCEDURE — 770020 HCHG ROOM/CARE - TELE (206)

## 2025-04-07 PROCEDURE — 99232 SBSQ HOSP IP/OBS MODERATE 35: CPT

## 2025-04-07 PROCEDURE — 71045 X-RAY EXAM CHEST 1 VIEW: CPT

## 2025-04-07 PROCEDURE — 700102 HCHG RX REV CODE 250 W/ 637 OVERRIDE(OP): Performed by: STUDENT IN AN ORGANIZED HEALTH CARE EDUCATION/TRAINING PROGRAM

## 2025-04-07 PROCEDURE — 700101 HCHG RX REV CODE 250: Performed by: STUDENT IN AN ORGANIZED HEALTH CARE EDUCATION/TRAINING PROGRAM

## 2025-04-07 RX ORDER — LIDOCAINE 4 G/G
1 PATCH TOPICAL EVERY 24 HOURS
Status: DISCONTINUED | OUTPATIENT
Start: 2025-04-07 | End: 2025-04-08 | Stop reason: HOSPADM

## 2025-04-07 RX ORDER — OMEPRAZOLE 20 MG/1
40 CAPSULE, DELAYED RELEASE ORAL 2 TIMES DAILY
Status: DISCONTINUED | OUTPATIENT
Start: 2025-04-07 | End: 2025-04-08 | Stop reason: HOSPADM

## 2025-04-07 RX ADMIN — HYDROMORPHONE HYDROCHLORIDE 0.5 MG: 1 INJECTION, SOLUTION INTRAMUSCULAR; INTRAVENOUS; SUBCUTANEOUS at 13:12

## 2025-04-07 RX ADMIN — OXYCODONE HYDROCHLORIDE 5 MG: 5 TABLET ORAL at 23:33

## 2025-04-07 RX ADMIN — HYDROMORPHONE HYDROCHLORIDE 0.5 MG: 1 INJECTION, SOLUTION INTRAMUSCULAR; INTRAVENOUS; SUBCUTANEOUS at 17:10

## 2025-04-07 RX ADMIN — HYDROMORPHONE HYDROCHLORIDE 0.5 MG: 1 INJECTION, SOLUTION INTRAMUSCULAR; INTRAVENOUS; SUBCUTANEOUS at 21:11

## 2025-04-07 RX ADMIN — OMEPRAZOLE 40 MG: 20 CAPSULE, DELAYED RELEASE ORAL at 17:11

## 2025-04-07 RX ADMIN — LIDOCAINE PAIN RELIEF 1 PATCH: 560 PATCH TOPICAL at 12:38

## 2025-04-07 RX ADMIN — OXYCODONE HYDROCHLORIDE 5 MG: 5 TABLET ORAL at 00:11

## 2025-04-07 RX ADMIN — AMPICILLIN AND SULBACTAM 3 G: 1; 2 INJECTION, POWDER, FOR SOLUTION INTRAMUSCULAR; INTRAVENOUS at 00:13

## 2025-04-07 RX ADMIN — AMOXICILLIN AND CLAVULANATE POTASSIUM 1 TABLET: 875; 125 TABLET, FILM COATED ORAL at 11:53

## 2025-04-07 RX ADMIN — AMPICILLIN AND SULBACTAM 3 G: 1; 2 INJECTION, POWDER, FOR SOLUTION INTRAMUSCULAR; INTRAVENOUS at 04:31

## 2025-04-07 RX ADMIN — PANTOPRAZOLE SODIUM 40 MG: 40 INJECTION, POWDER, FOR SOLUTION INTRAVENOUS at 04:30

## 2025-04-07 RX ADMIN — HYDROMORPHONE HYDROCHLORIDE 0.5 MG: 1 INJECTION, SOLUTION INTRAMUSCULAR; INTRAVENOUS; SUBCUTANEOUS at 08:45

## 2025-04-07 RX ADMIN — OXYCODONE HYDROCHLORIDE 5 MG: 5 TABLET ORAL at 04:28

## 2025-04-07 RX ADMIN — OMEPRAZOLE 40 MG: 20 CAPSULE, DELAYED RELEASE ORAL at 11:53

## 2025-04-07 RX ADMIN — AMOXICILLIN AND CLAVULANATE POTASSIUM 1 TABLET: 875; 125 TABLET, FILM COATED ORAL at 21:11

## 2025-04-07 ASSESSMENT — PAIN DESCRIPTION - PAIN TYPE
TYPE: ACUTE PAIN

## 2025-04-07 ASSESSMENT — ENCOUNTER SYMPTOMS
DIZZINESS: 0
COUGH: 0
FEVER: 0
WEAKNESS: 1
ABDOMINAL PAIN: 0
BLOOD IN STOOL: 1
CHILLS: 0

## 2025-04-07 ASSESSMENT — FIBROSIS 4 INDEX: FIB4 SCORE: 1.79

## 2025-04-07 NOTE — DISCHARGE PLANNING
Care Transition Team Discharge Planning    Anticipated Discharge Information  Discharge Disposition: Discharged to home/self care (01)  Discharge Address: [address] [address]  Discharge Contact Phone Number: [phone]    Discharge Plan: Patient discussed in IDT rounds. MD plans to initiate TBA to [facility] today. RNCM to remain available for DCP needs.

## 2025-04-07 NOTE — RESPIRATORY CARE
" SMOKING CESSATION EDUCATION by COPD CLINICAL EDUCATOR  4/7/2025 at 4:21 PM by Digna Singh, RRT     Smoking Cessation Intervention and education completed,  patients states  he quit smoking three weeks ago. He denies cravings and shortness of breath.    COPD Screen       COPD Assessment  COPD Clinical Specialists ONLY  COPD Education Initiated: Yes--Short Intervention (pt states he quit smoking three weeks ago)  Interdisciplinary Rounds: Attendance at Rounds (30 Min)    PFT Results    No results found for: \"PFT\"    Meds to Beds  [facility] provides bedside medication delivery for all eligible patients at discharge and you have been automatically enrolled in the Meds to Beds Program. Would you like to opt out of this program for any reason?: No - Stay Opted In    .           "

## 2025-04-07 NOTE — PROGRESS NOTES
Monitor Summary:   Rhythm: SR  Rate: 64-96  Measurement: .14/.10/.39  Ectopy: f pvc f pac

## 2025-04-07 NOTE — PROGRESS NOTES
"  DATE: 4/7/2025    Hospital Day 5  duodenal ulcer .    PPD 2 coil embo of GDA     INTERVAL EVENTS:  Hgb remains stable 7.6 (7.8)  No leukocytosis   No significant ongoing bloody BMs  Tolerating fulls    REVIEW OF SYSTEMS:  Comprehensive review of systems is negative with the exception of the aforementioned HPI, PMH, and PSH bullets in accordance with CMS guidelines.    PHYSICAL EXAMINATION:  Vital Signs: /72   Pulse 84   Temp 37 °C (98.6 °F) (Temporal)   Resp 16   Ht 1.727 m (5' 8\")   Wt 64.6 kg (142 lb 6.7 oz)   SpO2 90%   Physical Exam  Vitals and nursing note reviewed.   Constitutional:       General: He is not in acute distress.     Appearance: Normal appearance. He is not ill-appearing.   HENT:      Head: Normocephalic.   Eyes:      General:         Right eye: No discharge.         Left eye: No discharge.   Pulmonary:      Effort: Pulmonary effort is normal. No respiratory distress.   Abdominal:      General: Abdomen is flat. There is no distension.      Palpations: Abdomen is soft.      Tenderness: There is abdominal tenderness. There is no guarding.      Comments: Minimal epigastric tenderness   Skin:     General: Skin is warm and dry.      Capillary Refill: Capillary refill takes less than 2 seconds.   Neurological:      General: No focal deficit present.      Mental Status: He is alert and oriented to person, place, and time.   Psychiatric:         Mood and Affect: Mood normal.         Behavior: Behavior normal.         LABORATORY VALUES:  Recent Labs     04/05/25  0012 04/05/25  0901 04/06/25  0107 04/06/25  0620 04/06/25  1204 04/07/25  0136   WBC 9.8  --  9.3  --   --  8.6   RBC 2.12*  --  2.37*  --   --  2.44*   HEMOGLOBIN 6.6*   < > 7.3*  7.5* 7.8* 7.8* 7.6*   HEMATOCRIT 21.2*   < > 22.0*  22.2* 23.7* 24.0* 23.3*   .0*  --  93.7  --   --  95.5   MCH 31.1  --  31.6  --   --  31.1   MCHC 31.1*  --  33.8  --   --  32.6   RDW 76.7*  --  67.8*  --   --  65.1*   PLATELETCT 251  --  237  " --   --  315   MPV 9.2  --  9.2  --   --  9.1    < > = values in this interval not displayed.     Recent Labs     04/05/25 0012 04/06/25 0107 04/07/25 0136   SODIUM 134* 136 135   POTASSIUM 3.2* 3.7 3.6   CHLORIDE 108 110 106   CO2 18* 19* 20   GLUCOSE 104* 88 99   BUN 17 6* 4*   CREATININE 0.74 0.67 0.65   CALCIUM 7.6* 7.6* 7.8*     Recent Labs     04/05/25 0012 04/06/25 0107 04/07/25 0136   ASTSGOT 18 16 20   ALTSGPT 6 6 6   TBILIRUBIN 0.3 0.4 0.4   ALKPHOSPHAT 63 60 70   GLOBULIN 1.7* 1.7* 2.0     ASSESSMENT AND PLAN:    Duodenal ulcer:  - OK for regular diet  - No surgical intervention planned  - OK for transfer back from surgical perspective        ____________________________________     Lea Monroy P.A.-C.    DD: 4/6/2025  8:22 AM

## 2025-04-07 NOTE — CARE PLAN
The patient is Stable - Low risk of patient condition declining or worsening    Shift Goals  Clinical Goals: monitor labs  Patient Goals: less pain  Family Goals: not present    Progress made toward(s) clinical / shift goals:  Progressing      Problem: Knowledge Deficit - Standard  Goal: Patient and family/care givers will demonstrate understanding of plan of care, disease process/condition, diagnostic tests and medications  Outcome: Progressing  Note: Pt verbalizes understanding of plan of care     Problem: Pain - Standard  Goal: Alleviation of pain or a reduction in pain to the patient’s comfort goal  Outcome: Progressing  Note: Pt reports abdominal pain of 8-9/10. Medicated per MAR.

## 2025-04-07 NOTE — PROGRESS NOTES
[facility] Medicine Daily Progress Note    Date of Service  4/7/2025    Chief Complaint  Sam Potts is a 70 y.o. male admitted 4/2/2025 with UGIB and perforation     Hospital Course  No notes on file    Sam Potts is a 70 y.o. male who presented 4/2/2025 as a transfer positive facility for higher level of care and interventional radiology evaluation.      Patient was being evaluated at outside facility after having ongoing black tarry schools and bright red bloody bowel movements over the last week to week and a half.  Patient does endorse some generalized abdominal pain as well.      CT of the abdomen was performed and was concerning for perforation of the descending aspect of the duodenum.  Both general surgery and gastroenterology service consulted on the patient.  EGD is contraindicated at this time.  General surgery did not feel like a Darrel patch was appropriate, and recommended that the patient be seen by interventional radiology for embolization.  Of note, the patient was found to be severely anemic.  He received a total of 4 units packed PRBC.  Hemoglobin currently stable at 9.1.     Dr. Osei IR consulted and patient is now status post. satisfactory coil embo of GDA on 4/5 with no active bleeding noticed. General surgery advanced diet to regular diet.     Interval Problem Update  4/7:  Seen and evaluated patient at the bedside. Complaining of bilateral rib pain. CXR with no acute pathology. Ordered lidocaine patches. On tylenol, dilaudid, and oxycodone.   Hgb 7.6  Switch to oral PPI  Switch to Augmentin to finish course  Likely discharge tomorrow if clinically stable and tolerating the diet       I have discussed this patient's plan of care and discharge plan at IDT rounds today with Case Management, Nursing, Nursing leadership, and other members of the IDT team.    Consultants/Specialty  General surgery   GI   IR     Code Status  Full Code    Disposition  Not Medically Cleared  I have placed the  appropriate orders for post-discharge needs.    Review of Systems  Review of Systems   Constitutional:  Negative for chills, fever and malaise/fatigue.   Respiratory:  Negative for cough.    Cardiovascular:  Negative for chest pain and leg swelling.        Rib pain   Gastrointestinal:  Positive for blood in stool. Negative for abdominal pain.   Neurological:  Positive for weakness. Negative for dizziness.        Physical Exam  Temp:  [36.5 °C (97.7 °F)-37.1 °C (98.8 °F)] 36.7 °C (98.1 °F)  Pulse:  [74-84] 74  Resp:  [16-18] 16  BP: (101-116)/(59-72) 116/67  SpO2:  [90 %-97 %] 94 %    Physical Exam  Constitutional:       Appearance: Normal appearance.   HENT:      Head: Normocephalic.      Mouth/Throat:      Mouth: Mucous membranes are moist.   Cardiovascular:      Rate and Rhythm: Normal rate.   Pulmonary:      Effort: Pulmonary effort is normal.   Abdominal:      General: Abdomen is flat.      Palpations: Abdomen is soft.      Tenderness: There is abdominal tenderness.      Comments: Epigastric tenderness   Musculoskeletal:         General: Normal range of motion.   Skin:     General: Skin is warm.   Neurological:      General: No focal deficit present.      Mental Status: He is alert.         Fluids    Intake/Output Summary (Last 24 hours) at 4/7/2025 1319  Last data filed at 4/7/2025 0845  Gross per 24 hour   Intake 236 ml   Output 990 ml   Net -754 ml        Laboratory  Recent Labs     04/05/25  0012 04/05/25  0901 04/06/25  0107 04/06/25  0620 04/06/25  1204 04/07/25  0136   WBC 9.8  --  9.3  --   --  8.6   RBC 2.12*  --  2.37*  --   --  2.44*   HEMOGLOBIN 6.6*   < > 7.3*  7.5* 7.8* 7.8* 7.6*   HEMATOCRIT 21.2*   < > 22.0*  22.2* 23.7* 24.0* 23.3*   .0*  --  93.7  --   --  95.5   MCH 31.1  --  31.6  --   --  31.1   MCHC 31.1*  --  33.8  --   --  32.6   RDW 76.7*  --  67.8*  --   --  65.1*   PLATELETCT 251  --  237  --   --  315   MPV 9.2  --  9.2  --   --  9.1    < > = values in this interval not  displayed.     Recent Labs     04/05/25  0012 04/06/25  0107 04/07/25  0136   SODIUM 134* 136 135   POTASSIUM 3.2* 3.7 3.6   CHLORIDE 108 110 106   CO2 18* 19* 20   GLUCOSE 104* 88 99   BUN 17 6* 4*   CREATININE 0.74 0.67 0.65   CALCIUM 7.6* 7.6* 7.8*                     Imaging  DX-CHEST-PORTABLE (1 VIEW)   Final Result      1.  Low lung volumes without definite acute cardiopulmonary abnormality.      IR-EMBOLIZATION   Final Result      1.  No active extravasation, pseudoaneurysm, or injury or aberrant vessel identified involving the GDA or branches in the vicinity of the duodenal bleeding site.   2.  Empirical coil embolization performed as planned with satisfactory coil occlusion of the GDA. Note, upon removal of the microcatheter, the final coil prolapsed upward into the middle hepatic artery without flow compromise or occlusion. This was left    in place. Satisfactory coil occlusion of the GDA and side branches.   3.  Unremarkable SMA angiography.   4.  Right common femoral 6 Maltese Angio-Seal placement                  INTERPRETING LOCATION: [facility], [address]      DX-CHEST-LIMITED (1 VIEW)   Final Result         1.  Hazy right midlung opacity suggests subtle infiltrate   2.  Trace bilateral pleural effusions   3.  Left midlung pulmonary nodule, location and appearance suggest possible nipple shadow. Could be followed up with repeat chest x-ray with nipple markers to exclude pulmonary nodule.   4.  Atherosclerosis            Assessment/Plan  * UGIB (upper gastrointestinal bleed)- (present on admission)  Assessment & Plan  Reports using ibuprofen for low back pain  Also reports drinking alcohol and quit drinking couple weeks ago  Patient reports a 1-1 and half week history of melanotic and bright red bloody bowel movements  CT imaging was obtained concerning for perforation of the descending outside of the duodenum.  Case discussed with both general surgery and gastroenterology.    EGD is  contraindicated at this time  Patient is now status post 5units of packed PRBC  Trend hgb   PPI BID  Dr. Osei IR consulted and patient is now status post. satisfactory coil embo of GDA. NO ACTIVE BLEED PRESENT AT THIS TIME on 4/5  Now on regular diet   Avoid NSAIDs  Transfuse for hemoglobin < 7     Duodenal perforation (HCC)  Assessment & Plan  CT imaging:  Findings are concerning for perforation of the descending aspect of the duodenum, and correlation with endoscopy is suggested.   General surgery consulted.  No surgical intervention at this time.  They are not recommending a Darrel patch.    Dr. Osei IR consulted and patient is now status post. satisfactory coil embo of GDA. NO ACTIVE BLEED PRESENT AT THIS TIME on 4/5  Antibiotics switched from zosyn to IV unasyn. Blood culture with no growth to date and now on augmentin to finish course  Gen surgery advanced the diet to regular         Acute blood loss anemia  Assessment & Plan  Patient has received total 4 units packed PRBC from outside facility  Most recent hemoglobin of 9.1  Transfuse for hemoglobin less than 7  CBC in am       Falls- (present on admission)  Assessment & Plan  PT/OT evaluation     History of vertebral fracture- (present on admission)  Assessment & Plan  Compression deformity is noted involving the T8 vertebrae, and is favored represent sequela of chronic rather than acute process.     Rib fractures- (present on admission)  Assessment & Plan  Multiple rib fractures are present, variable age. Fractures involving posterior aspects of left ribs may be acute or subacute.  Multimodal pain control   CXR with no acute pathology     Hypokalemia- (present on admission)  Assessment & Plan  Replete and monitor          VTE prophylaxis: contraindicated due to GI bleed    I have performed a physical exam and reviewed and updated ROS and Plan today (4/7/2025). In review of yesterday's note (4/6/2025), there are no changes except as documented  above.    Patient has a high medical complexity, complex decision making and is at high risk of complication, morbidity, and mortality    Greater than 51 minutes spent prepping to see patient (e.g. review of tests) obtaining and/or reviewing separately obtained history. Performing a medically appropriate examination and/ evaluation.  Counseling and educating the patient/family/caregiver.  Ordering medications, tests, or procedures.  Referring and communicating with other health care professionals.  Documenting clinical information in EPIC.  Independently interpreting results and communicating results to patient/family/caregiver.  Care coordination

## 2025-04-07 NOTE — CARE PLAN
Problem: Knowledge Deficit - Standard  Goal: Patient and family/care givers will demonstrate understanding of plan of care, disease process/condition, diagnostic tests and medications  Outcome: Progressing     Problem: Pain - Standard  Goal: Alleviation of pain or a reduction in pain to the patient’s comfort goal  Outcome: Progressing  Note: Assess and monitor for pain. Provide pharmacological and non-pharmacological interventions as appropriate. Re-evaluate and continue to monitor.        Problem: Skin Integrity  Goal: Skin integrity is maintained or improved  Outcome: Progressing     Problem: Fall Risk  Goal: Patient will remain free from falls  Outcome: Not Progressing  Note: Patient refusing bed alarm. Charge RN notified.    The patient is Stable - Low risk of patient condition declining or worsening    Shift Goals  Clinical Goals: monitor hgb, increase mobility, pain control  Patient Goals: pain control, advance diet  Family Goals: not present    Progress made toward(s) clinical / shift goals:  patient cleared for regular diet    Patient is not progressing towards the following goals: pain      Problem: Fall Risk  Goal: Patient will remain free from falls  Outcome: Not Progressing  Note: Patient refusing bed alarm. Charge RN notified.

## 2025-04-07 NOTE — PROGRESS NOTES
Bedside report received from off going RN/tech: Danyelle, assumed care of patient.     Fall Risk Score: HIGH RISK  Fall risk interventions in place: Place yellow fall risk ID band on patient, Provide patient/family education based on risk assessment, Educate patient/family to call staff for assistance when getting out of bed, Place fall precaution signage outside patient door, Place patient in room close to nursing station, Notify charge of high risk for huddle, and Refuses - escalate to charge  Bed type: Regular (Umair Score less than 17 interventions in place)  Patient on cardiac monitor: Yes  IVF/IV medications: Not Applicable   Oxygen: Room Air  Bedside sitter: Not Applicable   Isolation: Not applicable

## 2025-04-08 ENCOUNTER — PHARMACY VISIT (OUTPATIENT)
Dept: PHARMACY | Facility: MEDICAL CENTER | Age: 70
End: 2025-04-08
Payer: COMMERCIAL

## 2025-04-08 VITALS
WEIGHT: 142.42 LBS | RESPIRATION RATE: 17 BRPM | TEMPERATURE: 97.5 F | HEART RATE: 72 BPM | OXYGEN SATURATION: 95 % | DIASTOLIC BLOOD PRESSURE: 69 MMHG | HEIGHT: 68 IN | BODY MASS INDEX: 21.58 KG/M2 | SYSTOLIC BLOOD PRESSURE: 106 MMHG

## 2025-04-08 LAB
ALBUMIN SERPL BCP-MCNC: 2.8 G/DL (ref 3.2–4.9)
ALBUMIN/GLOB SERPL: 1.2 G/DL
ALP SERPL-CCNC: 78 U/L (ref 30–99)
ALT SERPL-CCNC: 6 U/L (ref 2–50)
ANION GAP SERPL CALC-SCNC: 8 MMOL/L (ref 7–16)
AST SERPL-CCNC: 17 U/L (ref 12–45)
BILIRUB SERPL-MCNC: 0.3 MG/DL (ref 0.1–1.5)
BUN SERPL-MCNC: 6 MG/DL (ref 8–22)
CALCIUM ALBUM COR SERPL-MCNC: 9 MG/DL (ref 8.5–10.5)
CALCIUM SERPL-MCNC: 8 MG/DL (ref 8.5–10.5)
CHLORIDE SERPL-SCNC: 103 MMOL/L (ref 96–112)
CO2 SERPL-SCNC: 21 MMOL/L (ref 20–33)
CREAT SERPL-MCNC: 0.76 MG/DL (ref 0.5–1.4)
ERYTHROCYTE [DISTWIDTH] IN BLOOD BY AUTOMATED COUNT: 63.2 FL (ref 35.9–50)
GFR SERPLBLD CREATININE-BSD FMLA CKD-EPI: 97 ML/MIN/1.73 M 2
GLOBULIN SER CALC-MCNC: 2.3 G/DL (ref 1.9–3.5)
GLUCOSE SERPL-MCNC: 130 MG/DL (ref 65–99)
HCT VFR BLD AUTO: 25.9 % (ref 42–52)
HGB BLD-MCNC: 8.3 G/DL (ref 14–18)
MCH RBC QN AUTO: 30.9 PG (ref 27–33)
MCHC RBC AUTO-ENTMCNC: 32 G/DL (ref 32.3–36.5)
MCV RBC AUTO: 96.3 FL (ref 81.4–97.8)
PLATELET # BLD AUTO: 390 K/UL (ref 164–446)
PMV BLD AUTO: 9 FL (ref 9–12.9)
POTASSIUM SERPL-SCNC: 4 MMOL/L (ref 3.6–5.5)
PROT SERPL-MCNC: 5.1 G/DL (ref 6–8.2)
RBC # BLD AUTO: 2.69 M/UL (ref 4.7–6.1)
SODIUM SERPL-SCNC: 132 MMOL/L (ref 135–145)
WBC # BLD AUTO: 9.1 K/UL (ref 4.8–10.8)

## 2025-04-08 PROCEDURE — A9270 NON-COVERED ITEM OR SERVICE: HCPCS | Performed by: STUDENT IN AN ORGANIZED HEALTH CARE EDUCATION/TRAINING PROGRAM

## 2025-04-08 PROCEDURE — RXMED WILLOW AMBULATORY MEDICATION CHARGE: Performed by: STUDENT IN AN ORGANIZED HEALTH CARE EDUCATION/TRAINING PROGRAM

## 2025-04-08 PROCEDURE — 85027 COMPLETE CBC AUTOMATED: CPT

## 2025-04-08 PROCEDURE — 80053 COMPREHEN METABOLIC PANEL: CPT

## 2025-04-08 PROCEDURE — 99239 HOSP IP/OBS DSCHRG MGMT >30: CPT | Performed by: STUDENT IN AN ORGANIZED HEALTH CARE EDUCATION/TRAINING PROGRAM

## 2025-04-08 PROCEDURE — 36415 COLL VENOUS BLD VENIPUNCTURE: CPT

## 2025-04-08 PROCEDURE — 700102 HCHG RX REV CODE 250 W/ 637 OVERRIDE(OP): Performed by: STUDENT IN AN ORGANIZED HEALTH CARE EDUCATION/TRAINING PROGRAM

## 2025-04-08 PROCEDURE — 700111 HCHG RX REV CODE 636 W/ 250 OVERRIDE (IP): Mod: JZ | Performed by: STUDENT IN AN ORGANIZED HEALTH CARE EDUCATION/TRAINING PROGRAM

## 2025-04-08 RX ORDER — OXYCODONE HYDROCHLORIDE 10 MG/1
10 TABLET ORAL EVERY 4 HOURS PRN
Refills: 0 | Status: DISCONTINUED | OUTPATIENT
Start: 2025-04-08 | End: 2025-04-08 | Stop reason: HOSPADM

## 2025-04-08 RX ORDER — OXYCODONE HYDROCHLORIDE 10 MG/1
10 TABLET ORAL EVERY 4 HOURS PRN
Qty: 18 TABLET | Refills: 0 | Status: SHIPPED | OUTPATIENT
Start: 2025-04-08 | End: 2025-04-11

## 2025-04-08 RX ORDER — ACETAMINOPHEN 500 MG
1000 TABLET ORAL 3 TIMES DAILY
Qty: 30 TABLET | Refills: 0 | Status: SHIPPED | OUTPATIENT
Start: 2025-04-08

## 2025-04-08 RX ORDER — ACETAMINOPHEN 500 MG
1000 TABLET ORAL 3 TIMES DAILY
Status: DISCONTINUED | OUTPATIENT
Start: 2025-04-08 | End: 2025-04-08 | Stop reason: HOSPADM

## 2025-04-08 RX ORDER — OMEPRAZOLE 40 MG/1
40 CAPSULE, DELAYED RELEASE ORAL 2 TIMES DAILY
Qty: 60 CAPSULE | Refills: 0 | Status: SHIPPED | OUTPATIENT
Start: 2025-04-08

## 2025-04-08 RX ORDER — LIDOCAINE 4 G/G
1 PATCH TOPICAL EVERY 24 HOURS
Qty: 1 PATCH | Refills: 0 | Status: SHIPPED | OUTPATIENT
Start: 2025-04-08

## 2025-04-08 RX ADMIN — OXYCODONE HYDROCHLORIDE 10 MG: 10 TABLET ORAL at 08:47

## 2025-04-08 RX ADMIN — HYDROMORPHONE HYDROCHLORIDE 0.5 MG: 1 INJECTION, SOLUTION INTRAMUSCULAR; INTRAVENOUS; SUBCUTANEOUS at 01:12

## 2025-04-08 RX ADMIN — AMOXICILLIN AND CLAVULANATE POTASSIUM 1 TABLET: 875; 125 TABLET, FILM COATED ORAL at 07:58

## 2025-04-08 RX ADMIN — HYDROMORPHONE HYDROCHLORIDE 0.5 MG: 1 INJECTION, SOLUTION INTRAMUSCULAR; INTRAVENOUS; SUBCUTANEOUS at 05:16

## 2025-04-08 RX ADMIN — ACETAMINOPHEN 1000 MG: 500 TABLET ORAL at 07:58

## 2025-04-08 RX ADMIN — OMEPRAZOLE 40 MG: 20 CAPSULE, DELAYED RELEASE ORAL at 05:16

## 2025-04-08 ASSESSMENT — PAIN DESCRIPTION - PAIN TYPE
TYPE: ACUTE PAIN

## 2025-04-08 ASSESSMENT — FIBROSIS 4 INDEX: FIB4 SCORE: 1.23

## 2025-04-08 NOTE — DISCHARGE SUMMARY
Discharge Summary    CHIEF COMPLAINT ON ADMISSION  No chief complaint on file.      Reason for Admission  Other (Duodenal perforation)     Admission Date  4/2/2025    CODE STATUS  Full Code    HPI & HOSPITAL COURSE  70-year-old male with a past medical history of vertebral fractures was transferred from [place] on 4/2/2025 for suspect duodenal perforation and gastrointestinal bleed noted on CT imaging.  Patient started on IV hydration, broad-spectrum IV antibiotics and IV PPI.  Gastroenterology evaluating and recommending against EGD evaluation as this is contraindicated.  General surgery following who recommended against surgical intervention.  Interventional radiology following for which patient underwent coil embolization of GDA on 4/5/2025 which he tolerated well.  Patient tolerating regular diet and hemoglobin trending up.  Stable patient with in chronic condition was discharged home on omeprazole and instructed to follow-up with his primary care provider in the outpatient setting for which referrals were placed.      All results and plan of action discussed with the patient for which he voiced understanding and agreement with the primary care team.  Patient was instructed to return to emergency apartment symptoms were to worsen.    Therefore, he is discharged in good and stable condition to home with close outpatient follow-up.    The patient met 2-midnight criteria for an inpatient stay at the time of discharge.    Discharge Date  4/8/2025    FOLLOW UP ITEMS POST DISCHARGE  Primary care provider follow posthospital discharge care    DISCHARGE DIAGNOSES  Principal Problem:    UGIB (upper gastrointestinal bleed) (POA: Yes)  Active Problems:    Hypokalemia (POA: Yes)    Rib fractures (POA: Yes)    History of vertebral fracture (POA: Yes)    Falls (POA: Yes)    Acute blood loss anemia (POA: Unknown)    Duodenal perforation (HCC) (POA: Unknown)  Resolved Problems:    * No resolved hospital problems. *      FOLLOW  UP  No future appointments.  [facility] [address]  [address] [place] [address] 4  [place] [address]  [phone]  Call  Please call [facility] to establish with a Primary Care Provider. Thank you.      MEDICATIONS ON DISCHARGE     Medication List        START taking these medications        Instructions   lidocaine 4 % Ptch  Commonly known as: Asperflex   Place 1 Patch on the skin every 24 hours.  Dose: 1 Patch     omeprazole 40 MG delayed-release capsule  Commonly known as: PriLOSEC   Take 1 Capsule by mouth 2 times a day.  Dose: 40 mg     oxyCODONE immediate release 10 MG immediate release tablet  Commonly known as: Roxicodone   Take 1 Tablet by mouth every four hours as needed for Moderate Pain or Severe Pain for up to 3 days.  Dose: 10 mg            CHANGE how you take these medications        Instructions   acetaminophen 500 MG Tabs  What changed:   medication strength  how much to take  when to take this  reasons to take this  Commonly known as: Tylenol   Take 2 Tablets by mouth 3 times a day.  Dose: 1,000 mg            STOP taking these medications      ibuprofen 200 MG Tabs  Commonly known as: Motrin     naltrexone 50 MG Tabs  Commonly known as: Depade              Allergies  Allergies   Allergen Reactions    Apple Cider Vinegar Rash     Pt states he is allergic to all vinegar, pickles    Morphine Nausea       DIET  Orders Placed This Encounter   Procedures    Diet Order Diet: Regular     Standing Status:   Standing     Number of Occurrences:   1     Diet::   Regular [1]       ACTIVITY  As tolerated.  Weight bearing as tolerated    CONSULTATIONS  Gastroenterology  General Surgery  Interventional radiology    PROCEDURES  4/5/2025 Interventional radiology  CELIAC, JARVIS, GDA ANGIO, COIL EMBO GDA  SMA ANGIO  R CFA-ILIAC ANGIO FOR ANGIO-SEAL  6F ANGIOSEAL PLACED    LABORATORY  Lab Results   Component Value Date    SODIUM 132 (L) 04/08/2025    POTASSIUM 4.0 04/08/2025    CHLORIDE 103  04/08/2025    CO2 21 04/08/2025    GLUCOSE 130 (H) 04/08/2025    BUN 6 (L) 04/08/2025    CREATININE 0.76 04/08/2025        Lab Results   Component Value Date    WBC 9.1 04/08/2025    HEMOGLOBIN 8.3 (L) 04/08/2025    HEMATOCRIT 25.9 (L) 04/08/2025    PLATELETCT 390 04/08/2025        Total time of the discharge process exceeds 36 minutes.

## 2025-04-08 NOTE — PROGRESS NOTES
Monitor Summary  Rhythm: SR  Rate: 69-92, touched down to 47  Ectopy: (R) PVC, (O) PAC  .13 / .07 / .44

## 2025-04-08 NOTE — DISCHARGE PLANNING
Updated by bedside RN that patient requesting assistance with transport home. RNCM met with patient. Patient has $20 on him and no cards. Patient states he has no friends or family who can take him home to [facility]. Escalated to leadership, approval received for [employer]. Confirmed home address [address] [facility]. IMM signed. Bedside RN to inform RNCM when patient ready for [employer].

## 2025-04-08 NOTE — PROGRESS NOTES
Bedside report received from off going RN/tech: Danyelle BROOKE, assumed care of patient.     Fall Risk Score: HIGH RISK  Fall risk interventions in place: Place yellow fall risk ID band on patient, Provide patient/family education based on risk assessment, Educate patient/family to call staff for assistance when getting out of bed, Place fall precaution signage outside patient door, Place patient in room close to nursing station, Utilize bed/chair fall alarm, Notify charge of high risk for huddle, and Bed alarm connected correctly  Bed type: Regular (Umair Score less than 17 interventions in place)  Patient on cardiac monitor: Yes  IVF/IV medications: Not Applicable   Oxygen: Room Air  Bedside sitter: Not Applicable   Isolation: Not applicable

## 2025-04-08 NOTE — CARE PLAN
The patient is Stable - Low risk of patient condition declining or worsening    Shift Goals  Clinical Goals: monitor Hbg, increase mobility, pain control  Patient Goals: pain control  Family Goals: vrenon    Progress made toward(s) clinical / shift goals:    Problem: Knowledge Deficit - Standard  Goal: Patient and family/care givers will demonstrate understanding of plan of care, disease process/condition, diagnostic tests and medications  Description: Target End Date:  1-3 days or as soon as patient condition allowsDocument in Patient Education1.  Patient and family/caregiver oriented to unit, equipment, visitation policy and means for communicating concern2.  Complete/review Learning Assessment3.  Assess knowledge level of disease process/condition, treatment plan, diagnostic tests and medications4.  Explain disease process/condition, treatment plan, diagnostic tests and medications  Outcome: Progressing     Problem: Pain - Standard  Goal: Alleviation of pain or a reduction in pain to the patient’s comfort goal  Description: Target End Date:  Prior to discharge or change in level of careDocument on Vitals flowsheet1.  Document pain using the appropriate pain scale per order or unit policy2.  Educate and implement non-pharmacologic comfort measures (i.e. relaxation, distraction, massage, cold/heat therapy, etc.)3.  Pain management medications as ordered4.  Reassess pain after pain med administration per policy5.  If opiods administered assess patient's response to pain medication is appropriate per POSS sedation scale6.  Follow pain management plan developed in collaboration with patient and interdisciplinary team (including palliative care or pain specialists if applicable)  Outcome: Progressing     Problem: Skin Integrity  Goal: Skin integrity is maintained or improved  Description: Target End Date:  Prior to discharge or change in level of careDocument interventions on Skin Risk/Umair flowsheet groups and  corresponding LDA1.  Assess and monitor skin integrity, appearance and/or temperature2.  Assess risk factors for impaired skin integrity and/or pressures ulcers3.  Implement precautions to protect skin integrity in collaboration with interdisciplinary team4.  Implement pressure ulcer prevention protocol if at risk for skin breakdown5.  Confirm wound care consult if at risk for skin breakdown6.  Ensure patient use of pressure relieving devices  (Low air loss bed, waffle overlay, heel protectors, ROHO cushion, etc)  Outcome: Progressing     Problem: Fall Risk  Goal: Patient will remain free from falls  Description: Target End Date:  Prior to discharge or change in level of careDocument interventions on the [facility] Fall Risk Assessment1.  Assess for fall risk factors2.  Implement fall precautions  Outcome: Progressing     Problem: Hemodynamics  Goal: Patient's hemodynamics, fluid balance and neurologic status will be stable or improve  Description: Target End Date:  Prior to discharge or change in level of careDocument on Assessment and I/O flowsheet templates1.  Monitor vital signs, pulse oximetry and cardiac monitor per provider order and/or policy2.  Maintain blood pressure per provider order3.  Hemodynamic monitoring per provider order4.  Manage IV fluids and IV infusions5.  Monitor intake and output6.  Daily weights per unit policy or provider order7.  Assess peripheral pulses and capillary refill8.  Assess color and body temperature9.  Position patient for maximum circulation/cardiac [id number]. Monitor for signs/symptoms of excessive [id number]. Assess mental status, restlessness and changes in level of [id number]. Monitor temperature and report fever or hypothermia to provider immediately. Consideration of targeted temperature management.  Outcome: Progressing     Problem: Respiratory  Goal: Patient will achieve/maintain optimum respiratory ventilation and gas exchange  Description: Target End Date:   Prior to discharge or change in level of careDocument on Assessment flowsheet1.  Assess and monitor rate, rhythm, depth and effort of respiration2.  Breath sounds assessed qshift and/or as needed3.  Assess O2 saturation, administer/titrate oxygen as ordered4.  Position patient for maximum ventilatory efficiency5.  Turn, cough, and deep breath with splinting to improve effectiveness6.  Collaborate with RT to administer medication/treatments per order7.  Encourage use of incentive spirometer and encourage patient to cough after use and utilize splinting techniques if applicable8.  Airway suctioning9.  Monitor sputum production for changes in color, consistency and [id number]. Perform frequent oral [id number]. Alternate physical activity with rest periods  Outcome: Progressing     Problem: Nutrition  Goal: Patient's nutritional and fluid intake will be adequate or improve  Description: Target End Date:  Prior to discharge or change in level of careDocument on I/O flowsheet1.  Monitor nutritional intake2.  Monitor weight per provider order3.  Assess patient's ability to take oral nutrition4.  Collaborate with Speech Therapy, Dietitian and interdisciplinary team for appropriate feeding and fluid intake5.  Assist with feeding  Outcome: Progressing  Goal: Enteral nutrition will be maintained or improve  Description: Target End Date:  Prior to discharge or change in level of care1. Enteral access to be obtained or modified2. Advance to goal rate per protocol3. Collaborate with Clinical Dietitian for signs/symptoms of intolerance4. Weights per provider order5. Consider Speech Therapy consult for swallowing difficulties  Outcome: Progressing  Goal: Enteral nutrition will be maintained or improve  Description: Target End Date:  Prior to discharge or change in level of care1.  Fingerstick glucose every 8 hours2.  Notify provider for fever or elevated glucose3.  TPN tubing and port changes every 24 hours.  Turn TPN through  dedicated line. (Document on LDA)4.  TPN dressing changes 24 hours after insertion and then every Saturday  (Document on LDA)5.  Daily weights6.  Monitor TPN lab results7.  Collaborate with Clinical Dietician8.  Collaborate with Pharmacist  Outcome: Progressing     Problem: Bowel Elimination  Goal: Establish and maintain regular bowel function  Description: Target End Date:  Prior to discharge or change in level of care1.   Note date of last BM2.   Educate about diet, fluid intake, medication and activity to promote bowel function3.   Educate signs and symptoms of constipation and interventions to implement4.   Pharmacologic bowel management per provider order5.   Regular toileting schedule6.   Upright position for toileting7.   High fiber diet8.   Encourage hydration9.   Collaborate with Clinical [id number]. Care and maintenance of ostomy if applicable  Outcome: Progressing     Problem: Gastrointestinal Irritability  Goal: Nausea and vomiting will be absent or improve  Description: Target End Date:  Prior to discharge or change in level of careDocument on I/O and Assessment flowsheets1.  Assess for history, duration, frequency, severity, and potential precipitating factors2.  Administer antiemetics as ordered3.  Emesis basin within easy reach of the patient, but out of sight if psychogenic component4.  Eliminate strong odors from surroundings5.  Introduce cold water, ice chips, damien products, and room temperature broth or bouillon if tolerated and appropriate to the patient's diet6.  Encourage small amounts of bland, simple foods like broth, rice, bananas, Jell-O, crackers or toast if tolerated and appropriate to patient's diet7.  Position the patient upright while eating and for 1 to 2 hours post-meal8.  Encourage nonpharmacological nausea control techniques such as relaxation, guided imagery, music therapy, distraction, or deep breathing exercises  Outcome: Progressing  Goal: Diarrhea will be absent or  improved  Description: Target End Date:  Prior to discharge or change in level of care1.  Assess for abdominal discomfort, pain, cramping, frequency, urgency, loose or liquid stools, and hyperactive bowel sensations.2.  Evaluate pattern of defecation3.  Administer antidiarrheal agents per order4. Culture stool, per order5.  Inquire about food intolerances, medications, change in eating pattern and current stressors6.  Assess for fecal impaction7.  Assess hydration status8.  Assess condition of perianal skin9.  Loss of bowel elimination control can lead of feelings of decreased self esteem.  Examine the emotional impact of illness, hospitalization and/or accidents.  Outcome: Progressing     Problem: Self Care  Goal: Patient will have the ability to perform ADLs independently or with assistance (bathe, groom, dress, toilet and feed)  Description: Target End Date:  Prior to discharge or change in level of careDocument on ADL flowsheet1.  Assess the capability and level of deficiency to perform ADLs2.  Encourage family/care giver involvement3.  Provide assistive devices4.  Consider PT/OT evaluations5.  Maintain support, give positive feedback, encourage self-care allowing extra time and verbal cuing as needed6.  Avoid doing something for patients they can do themselves, but provide assistance as needed7.  Assist in anticipating/planning individual needs8.  Collaborate with Case Management and  to meet discharge needs  Outcome: Progressing

## 2025-04-08 NOTE — PROGRESS NOTES
Bedside report received from off going RN/tech: Deanna assumed care of patient.     Fall Risk Score: HIGH RISK  Fall risk interventions in place: Place yellow fall risk ID band on patient, Provide patient/family education based on risk assessment, Educate patient/family to call staff for assistance when getting out of bed, Place fall precaution signage outside patient door, Place patient in room close to nursing station, Utilize bed/chair fall alarm, and Bed alarm connected correctly  Bed type: Regular (Umair Score less than 17 interventions in place)  Patient on cardiac monitor: Yes  IVF/IV medications: Not Applicable   Oxygen: Room Air  Bedside sitter: Not Applicable   Isolation: Not applicable

## 2025-04-08 NOTE — PROGRESS NOTES
Pt dc home. IV and tele box removed prior to discharge. Pt received copy of discharge instructions. Education provided about discharge instructions. Pt received meds to beds including narcotic pain meds. All questions answered. Pt taken down in wheelchair to [facility].

## 2025-07-15 ENCOUNTER — HOSPITAL ENCOUNTER (OUTPATIENT)
Dept: RADIOLOGY | Facility: MEDICAL CENTER | Age: 70
End: 2025-07-15